# Patient Record
Sex: MALE | Race: WHITE | NOT HISPANIC OR LATINO | Employment: FULL TIME | URBAN - NONMETROPOLITAN AREA
[De-identification: names, ages, dates, MRNs, and addresses within clinical notes are randomized per-mention and may not be internally consistent; named-entity substitution may affect disease eponyms.]

---

## 2017-06-17 ENCOUNTER — OFFICE VISIT (OUTPATIENT)
Dept: URGENT CARE | Facility: CLINIC | Age: 47
End: 2017-06-17

## 2017-06-17 PROCEDURE — 96372 THER/PROPH/DIAG INJ SC/IM: CPT

## 2017-06-17 PROCEDURE — 99213 OFFICE O/P EST LOW 20 MIN: CPT

## 2018-01-13 NOTE — RESULT NOTES
Verified Results  (1) LIPID PANEL, FASTING [id number] 08:43AM Chu [place]     Test Name Result Flag Reference   CHOLESTEROL 194 mg/dL     HDL,DIRECT 29 mg/dL L 40-60   Specimen collection should occur prior to Metamizole administration due to the potential for falsely depressed results  LDL CHOLESTEROL CALCULATED 136 mg/dL H 0-100   Triglyceride:         Normal              <150 mg/dl       Borderline High    150-199 mg/dl       High               200-499 mg/dl       Very High          >499 mg/dl  Cholesterol:         Desirable        <200 mg/dl      Borderline High  200-239 mg/dl      High             >239 mg/dl  HDL Cholesterol:        High    >59 mg/dL      Low     <41 mg/dL  LDL CALCULATED:    This screening LDL is a calculated result  It does not have the accuracy of the Direct Measured LDL in the monitoring of patients with hyperlipidemia and/or statin therapy  Direct Measure LDL (GAW591) must be ordered separately in these patients  TRIGLYCERIDES 143 mg/dL  <=150   Specimen collection should occur prior to N-Acetylcysteine or Metamizole administration due to the potential for falsely depressed results

## 2018-01-16 NOTE — PROGRESS NOTES
Assessment    1  Encounter for preventive health examination (V70 0) (Z00 00)    Plan  Health Maintenance    · Nicotine 21 MG/24HR Transdermal Patch 24 Hour; APPLY 1 PATCH DAILY AS  DIRECTED    Discussion/Summary  Impression: health maintenance visit  Currently, he eats a healthy diet and eats an adequate diet  Prostate cancer screening: the risks and benefits of prostate cancer screening were discussed, prostate cancer screening is current and PSA is not indicated  Testicular cancer screening: the risks and benefits of testicular cancer screening were discussed, self testicular exam technique was taught and monthly self testicular exam was advised  Colorectal cancer screening: colorectal cancer screening is not indicated  The risks and benefits of immunizations were discussed and immunizations are up to date  Advice and education were given regarding nutrition, aerobic exercise, weight bearing exercise, weight loss, calcium supplements, vitamin D supplements, reproductive health, cardiovascular risk reduction, tobacco cessation, alcohol use, sunscreen use, self skin examination, helmet use, seat belt use, fall risk reduction and advanced directive planning  Patient discussion: discussed with the patient  History of Present Illness  HM, Adult Male: The patient is being seen for a health maintenance evaluation  General Health: The patient's health since the last visit is described as good  He has regular dental visits  He denies vision problems  He denies hearing loss  Immunizations status: up to date  Lifestyle:  He consumes a diverse and healthy diet  He has weight concerns  He does not exercise regularly  He does not use tobacco  He denies alcohol use  He denies drug use  Reproductive health:  the patient is sexually active  birth control is not being practiced  He denies erectile dysfunction  Screening: cancer screening reviewed and updated  metabolic screening reviewed and updated     risk screening reviewed and updated  Review of Systems    Constitutional: No fever or chills, feels well, no tiredness, no recent weight gain or weight loss  Eyes: No complaints of eye pain, no red eyes, no discharge from eyes, no itchy eyes  ENT: no complaints of earache, no hearing loss, no nosebleeds, no nasal discharge, no sore throat, no hoarseness  Cardiovascular: No complaints of slow heart rate, no fast heart rate, no chest pain, no palpitations, no leg claudication, no lower extremity  Respiratory: No complaints of shortness of breath, no wheezing, no cough, no SOB on exertion, no orthopnea or PND  Gastrointestinal: No complaints of abdominal pain, no constipation, no nausea or vomiting, no diarrhea or bloody stools  Genitourinary: No complaints of dysuria, no incontinence, no hesitancy, no nocturia, no genital lesion, no testicular pain  Musculoskeletal: No complaints of arthralgia, no myalgias, no joint swelling or stiffness, no limb pain or swelling  Integumentary: No complaints of skin rash or skin lesions, no itching, no skin wound, no dry skin  Neurological: No compliants of headache, no confusion, no convulsions, no numbness or tingling, no dizziness or fainting, no limb weakness, no difficulty walking  Psychiatric: Is not suicidal, no sleep disturbances, no anxiety or depression, no change in personality, no emotional problems  Endocrine: No complaints of proptosis, no hot flashes, no muscle weakness, no erectile dysfunction, no deepening of the voice, no feelings of weakness  Hematologic/Lymphatic: No complaints of swollen glands, no swollen glands in the neck, does not bleed easily, no easy bruising  Active Problems    1  Abnormal glucose (790 29) (R73 09)   2  Dyslipidemia (272 4) (E78 5)   3  Fatigue (780 79) (R53 83)   4  Folliculitis (382 2) (H36 4)   5   Nausea (787 02) (R11 0)    Past Medical History    · History of Denial Of Any Significant Medical History   · History of gastroenteritis (V12 79) (Z87 19)    Surgical History    · History of Cholecystectomy    Family History  Family History    · Family history of Cancer    Social History    · Current Every Day Smoker (305 1)    Current Meds   1  No Reported Medications Recorded    Allergies    1  No Known Drug Allergies    2  IV Dye    Vitals   Recorded: [id number] 04:58PM   Heart Rate 90   Systolic 941, LUE, Sitting   Diastolic 84, LUE, Sitting   Height 6 ft 4 in   Weight 340 lb 4 00 oz   BMI Calculated 41 42   BSA Calculated 2 77     Physical Exam    Constitutional   General appearance: Abnormal   overweight  Eyes   Conjunctiva and lids: No swelling, erythema, or discharge  Pupils and irises: Equal, round and reactive to light  Ears, Nose, Mouth, and Throat   External inspection of ears and nose: Normal     Otoscopic examination: Tympanic membrance translucent with normal light reflex  Canals patent without erythema  Oropharynx: Normal with no erythema, edema, exudate or lesions  Pulmonary   Respiratory effort: No increased work of breathing or signs of respiratory distress  Auscultation of lungs: Clear to auscultation  Cardiovascular   Auscultation of heart: Normal rate and rhythm, normal S1 and S2, without murmurs  Examination of extremities for edema and/or varicosities: Normal     Abdomen   Abdomen: Non-tender, no masses  Liver and spleen: No hepatomegaly or splenomegaly  Lymphatic   Palpation of lymph nodes in neck: No lymphadenopathy  Musculoskeletal   Gait and station: Normal     Skin   Skin and subcutaneous tissue: Normal without rashes or lesions  Neurologic   Cranial nerves: Cranial nerves 2-12 intact  Reflexes: 2+ and symmetric  Sensation: No sensory loss  Psychiatric   Orientation to person, place and time: Normal     Mood and affect: Normal        Procedure    Procedure: Hearing Acuity Test    Indication: Routine screeing  Audiometry: Normal bilaterally     The patient Tolerated the procedure well  There were no complications  Procedure: Visual Acuity Test    Indication: routine screening  Results: normal in both eyes  The patient tolerated the procedure well  There were no complications        Signatures   Electronically signed by : [facility]; May  9 2016  5:28PM EST                       (Author)    Electronically signed by : Author Nick DO; May  9 2016  5:44PM EST                       (Author)

## 2018-01-16 NOTE — RESULT NOTES
Verified Results  (1) HEMOGLOBIN A1C [id number] 11:59AM Miles Avalos Order Number: [id number]     Test Name Result Flag Reference   HEMOGLOBIN A1C 5 3 %  4 0-5 6   EST  AVG   GLUCOSE 105 mg/dl     5 7-6 4% impaired fasting glucose  >=6 5% diagnosis of diabetes    Falsely low levels are seen in conditions linked to short RBC life span-  hemolytic anemia, and splenomegaly  Falsely elevated levels are seen in situations where there is an increased production of RBC- receipt of erythropoietin or blood transfusions  Adopted from ADA-Clinical Practice Recommendations

## 2018-01-16 NOTE — RESULT NOTES
Verified Results  (1) TSH WITH FT4 REFLEX [id number] 11:59AM Joelle Lies Order Number: [id number]  TW Order Number: [id number] Order Number: [id number]     Test Name Result Flag Reference   TSH 2 057 uIU/mL  0 358-3 740     (1) COMPREHENSIVE METABOLIC PANEL [id number] 50:22KC Joelle Brigida Order Number: [id number]  TW Order Number: [id number] Order Number: [id number]     Test Name Result Flag Reference   GLUCOSE,RANDM 85 mg/dL     If the patient is fasting, the ADA then defines impaired fasting glucose as > 100 mg/dL and diabetes as > or equal to 123 mg/dL  SODIUM 140 mmol/L  136-145   POTASSIUM 4 4 mmol/L  3 5-5 3   CHLORIDE 104 mmol/L  100-108   CARBON DIOXIDE 27 mmol/L  21-32   ANION GAP (CALC) 9 mmol/L  4-13   BLOOD UREA NITROGEN 12 mg/dL  5-25   CREATININE 0 90 mg/dL  0 60-1 30   Standardized to IDMS reference method   CALCIUM 9 3 mg/dL  8 3-10 1   BILI, TOTAL 0 30 mg/dL  0 20-1 00   ALK PHOSPHATAS 77 U/L     ALT (SGPT) 37 U/L  12-78   AST(SGOT) 15 U/L  5-45   ALBUMIN 3 6 g/dL  3 5-5 0   TOTAL PROTEIN 6 8 g/dL  6 4-8 2   eGFR Non-African American      >60 0 ml/min/1 73[address] [facility] Disease Education Program recommendations are as follows:  GFR calculation is accurate only with a steady state creatinine  Chronic Kidney disease less than 60 ml/min/1 73 sq  meters  Kidney failure less than 15 ml/min/1 73 sq  meters  (1) LIPID PANEL, FASTING [id number] 11:59AM Joelle Lies Order Number: [id number]  TW Order Number: [id number]     Test Name Result Flag Reference   CHOLESTEROL 181 mg/dL     HDL,DIRECT 29 mg/dL L 40-60   Specimen collection should occur prior to Metamizole administration due to the potential for falsely depressed results     LDL CHOLESTEROL CALCULATED 113 mg/dL H 0-100   Triglyceride:         Normal              <150 mg/dl       Borderline High    150-199 mg/dl       High               200-499 mg/dl       Very High          >499 mg/dl  Cholesterol:         Desirable        <200 mg/dl      Borderline High  200-239 mg/dl      High             >239 mg/dl  HDL Cholesterol:        High    >59 mg/dL      Low     <41 mg/dL  LDL CALCULATED:    This screening LDL is a calculated result  It does not have the accuracy of the Direct Measured LDL in the monitoring of patients with hyperlipidemia and/or statin therapy  Direct Measure LDL (XTJ955) must be ordered separately in these patients  TRIGLYCERIDES 195 mg/dL H <=150   Specimen collection should occur prior to N-Acetylcysteine or Metamizole administration due to the potential for falsely depressed results

## 2020-12-01 ENCOUNTER — HOSPITAL ENCOUNTER (EMERGENCY)
Facility: HOSPITAL | Age: 50
Discharge: HOME | End: 2020-12-01
Attending: EMERGENCY MEDICINE
Payer: OTHER GOVERNMENT

## 2020-12-01 ENCOUNTER — APPOINTMENT (EMERGENCY)
Dept: RADIOLOGY | Facility: HOSPITAL | Age: 50
End: 2020-12-01
Payer: OTHER GOVERNMENT

## 2020-12-01 VITALS
SYSTOLIC BLOOD PRESSURE: 148 MMHG | HEIGHT: 75 IN | BODY MASS INDEX: 37.96 KG/M2 | DIASTOLIC BLOOD PRESSURE: 83 MMHG | WEIGHT: 305.34 LBS | OXYGEN SATURATION: 98 % | TEMPERATURE: 97.9 F | HEART RATE: 83 BPM | RESPIRATION RATE: 16 BRPM

## 2020-12-01 DIAGNOSIS — R51.9 CEPHALGIA: ICD-10-CM

## 2020-12-01 DIAGNOSIS — R05.9 COUGH: ICD-10-CM

## 2020-12-01 DIAGNOSIS — J06.9 URI (UPPER RESPIRATORY INFECTION): Primary | ICD-10-CM

## 2020-12-01 DIAGNOSIS — R11.0 NAUSEA: ICD-10-CM

## 2020-12-01 PROCEDURE — 96374 THER/PROPH/DIAG INJ IV PUSH: CPT

## 2020-12-01 PROCEDURE — U0003 INFECTIOUS AGENT DETECTION BY NUCLEIC ACID (DNA OR RNA); SEVERE ACUTE RESPIRATORY SYNDROME CORONAVIRUS 2 (SARS-COV-2) (CORONAVIRUS DISEASE [COVID-19]), AMPLIFIED PROBE TECHNIQUE, MAKING USE OF HIGH THROUGHPUT TECHNOLOGIES AS DESCRIBED BY CMS-2020-01-R: HCPCS | Performed by: EMERGENCY MEDICINE

## 2020-12-01 PROCEDURE — 96375 TX/PRO/DX INJ NEW DRUG ADDON: CPT

## 2020-12-01 PROCEDURE — 99283 EMERGENCY DEPT VISIT LOW MDM: CPT

## 2020-12-01 PROCEDURE — 96361 HYDRATE IV INFUSION ADD-ON: CPT

## 2020-12-01 PROCEDURE — 71045 X-RAY EXAM CHEST 1 VIEW: CPT

## 2020-12-01 PROCEDURE — 99284 EMERGENCY DEPT VISIT MOD MDM: CPT | Performed by: EMERGENCY MEDICINE

## 2020-12-01 RX ORDER — ONDANSETRON 4 MG/1
4 TABLET, ORALLY DISINTEGRATING ORAL EVERY 6 HOURS PRN
Qty: 20 TABLET | Refills: 0 | Status: SHIPPED | OUTPATIENT
Start: 2020-12-01 | End: 2020-12-07 | Stop reason: HOSPADM

## 2020-12-01 RX ORDER — METOCLOPRAMIDE HYDROCHLORIDE 5 MG/ML
10 INJECTION INTRAMUSCULAR; INTRAVENOUS ONCE
Status: COMPLETED | OUTPATIENT
Start: 2020-12-01 | End: 2020-12-01

## 2020-12-01 RX ORDER — DIPHENHYDRAMINE HYDROCHLORIDE 50 MG/ML
25 INJECTION INTRAMUSCULAR; INTRAVENOUS ONCE
Status: COMPLETED | OUTPATIENT
Start: 2020-12-01 | End: 2020-12-01

## 2020-12-01 RX ORDER — KETOROLAC TROMETHAMINE 30 MG/ML
15 INJECTION, SOLUTION INTRAMUSCULAR; INTRAVENOUS ONCE
Status: COMPLETED | OUTPATIENT
Start: 2020-12-01 | End: 2020-12-01

## 2020-12-01 RX ADMIN — DIPHENHYDRAMINE HYDROCHLORIDE 25 MG: 50 INJECTION, SOLUTION INTRAMUSCULAR; INTRAVENOUS at 14:16

## 2020-12-01 RX ADMIN — SODIUM CHLORIDE 1000 ML: 0.9 INJECTION, SOLUTION INTRAVENOUS at 14:16

## 2020-12-01 RX ADMIN — METOCLOPRAMIDE HYDROCHLORIDE 10 MG: 5 INJECTION INTRAMUSCULAR; INTRAVENOUS at 14:16

## 2020-12-01 RX ADMIN — KETOROLAC TROMETHAMINE 15 MG: 30 INJECTION, SOLUTION INTRAMUSCULAR at 14:16

## 2020-12-03 LAB — SARS-COV-2 RNA SPEC QL NAA+PROBE: NOT DETECTED

## 2020-12-05 ENCOUNTER — APPOINTMENT (EMERGENCY)
Dept: RADIOLOGY | Facility: HOSPITAL | Age: 50
DRG: 871 | End: 2020-12-05

## 2020-12-05 ENCOUNTER — APPOINTMENT (EMERGENCY)
Dept: CT IMAGING | Facility: HOSPITAL | Age: 50
DRG: 871 | End: 2020-12-05

## 2020-12-05 ENCOUNTER — HOSPITAL ENCOUNTER (INPATIENT)
Facility: HOSPITAL | Age: 50
LOS: 2 days | Discharge: HOME HEALTH | DRG: 871 | End: 2020-12-07
Attending: EMERGENCY MEDICINE | Admitting: INTERNAL MEDICINE

## 2020-12-05 DIAGNOSIS — R06.00 DYSPNEA AND RESPIRATORY ABNORMALITY: Primary | ICD-10-CM

## 2020-12-05 DIAGNOSIS — R06.89 DYSPNEA AND RESPIRATORY ABNORMALITY: Primary | ICD-10-CM

## 2020-12-05 DIAGNOSIS — A41.9 SEPSIS, DUE TO UNSPECIFIED ORGANISM, UNSPECIFIED WHETHER ACUTE ORGAN DYSFUNCTION PRESENT (HCC): ICD-10-CM

## 2020-12-05 DIAGNOSIS — R31.29 MICROSCOPIC HEMATURIA: ICD-10-CM

## 2020-12-05 DIAGNOSIS — R25.1 TREMOR: ICD-10-CM

## 2020-12-05 DIAGNOSIS — J32.9 SINUSITIS: ICD-10-CM

## 2020-12-05 DIAGNOSIS — J98.4 CAVITARY LESION OF LUNG: ICD-10-CM

## 2020-12-05 DIAGNOSIS — J02.9 SORE THROAT: ICD-10-CM

## 2020-12-05 DIAGNOSIS — R05.9 COUGH: ICD-10-CM

## 2020-12-05 PROBLEM — Z72.0 TOBACCO USE: Status: ACTIVE | Noted: 2020-12-05

## 2020-12-05 PROBLEM — J90 PLEURAL EFFUSION ON LEFT: Status: ACTIVE | Noted: 2020-12-05

## 2020-12-05 LAB
ALBUMIN SERPL BCP-MCNC: 3.6 G/DL (ref 3.5–5)
ALP SERPL-CCNC: 100 U/L (ref 46–116)
ALT SERPL W P-5'-P-CCNC: 24 U/L (ref 12–78)
ANION GAP SERPL CALCULATED.3IONS-SCNC: 8 MMOL/L (ref 4–13)
APTT PPP: 27 SECONDS (ref 23–37)
AST SERPL W P-5'-P-CCNC: 12 U/L (ref 5–45)
BACTERIA UR QL AUTO: ABNORMAL /HPF
BASOPHILS # BLD AUTO: 0.09 THOUSANDS/ΜL (ref 0–0.1)
BASOPHILS NFR BLD AUTO: 1 % (ref 0–1)
BILIRUB SERPL-MCNC: 0.4 MG/DL (ref 0.2–1)
BILIRUB UR QL STRIP: NEGATIVE
BUN SERPL-MCNC: 12 MG/DL (ref 5–25)
CALCIUM SERPL-MCNC: 9.3 MG/DL (ref 8.3–10.1)
CHLORIDE SERPL-SCNC: 103 MMOL/L (ref 100–108)
CLARITY UR: CLEAR
CO2 SERPL-SCNC: 25 MMOL/L (ref 21–32)
COLOR UR: YELLOW
CREAT SERPL-MCNC: 1.08 MG/DL (ref 0.6–1.3)
EOSINOPHIL # BLD AUTO: 0.57 THOUSAND/ΜL (ref 0–0.61)
EOSINOPHIL NFR BLD AUTO: 4 % (ref 0–6)
ERYTHROCYTE [DISTWIDTH] IN BLOOD BY AUTOMATED COUNT: 12.8 % (ref 11.6–15.1)
FLUAV RNA RESP QL NAA+PROBE: NEGATIVE
FLUBV RNA RESP QL NAA+PROBE: NEGATIVE
GFR SERPL CREATININE-BSD FRML MDRD: 81 ML/MIN/1.73SQ M
GLUCOSE SERPL-MCNC: 94 MG/DL (ref 65–140)
GLUCOSE UR STRIP-MCNC: NEGATIVE MG/DL
HCT VFR BLD AUTO: 51.1 % (ref 36.5–49.3)
HGB BLD-MCNC: 17 G/DL (ref 12–17)
HGB UR QL STRIP.AUTO: ABNORMAL
IMM GRANULOCYTES # BLD AUTO: 0.05 THOUSAND/UL (ref 0–0.2)
IMM GRANULOCYTES NFR BLD AUTO: 0 % (ref 0–2)
INR PPP: 0.91 (ref 0.84–1.19)
KETONES UR STRIP-MCNC: NEGATIVE MG/DL
LEUKOCYTE ESTERASE UR QL STRIP: ABNORMAL
LIPASE SERPL-CCNC: 135 U/L (ref 73–393)
LYMPHOCYTES # BLD AUTO: 2.28 THOUSANDS/ΜL (ref 0.6–4.47)
LYMPHOCYTES NFR BLD AUTO: 16 % (ref 14–44)
MAGNESIUM SERPL-MCNC: 2 MG/DL (ref 1.6–2.6)
MCH RBC QN AUTO: 30.7 PG (ref 26.8–34.3)
MCHC RBC AUTO-ENTMCNC: 33.3 G/DL (ref 31.4–37.4)
MCV RBC AUTO: 92 FL (ref 82–98)
MONOCYTES # BLD AUTO: 0.87 THOUSAND/ΜL (ref 0.17–1.22)
MONOCYTES NFR BLD AUTO: 6 % (ref 4–12)
MUCOUS THREADS UR QL AUTO: ABNORMAL
NEUTROPHILS # BLD AUTO: 10.77 THOUSANDS/ΜL (ref 1.85–7.62)
NEUTS SEG NFR BLD AUTO: 73 % (ref 43–75)
NITRITE UR QL STRIP: NEGATIVE
NON-SQ EPI CELLS URNS QL MICRO: ABNORMAL /HPF
NRBC BLD AUTO-RTO: 0 /100 WBCS
NT-PROBNP SERPL-MCNC: 92 PG/ML
PH UR STRIP.AUTO: 6.5 [PH]
PLATELET # BLD AUTO: 291 THOUSANDS/UL (ref 149–390)
PMV BLD AUTO: 9.9 FL (ref 8.9–12.7)
POTASSIUM SERPL-SCNC: 4.3 MMOL/L (ref 3.5–5.3)
PROT SERPL-MCNC: 8 G/DL (ref 6.4–8.2)
PROT UR STRIP-MCNC: NEGATIVE MG/DL
PROTHROMBIN TIME: 12.1 SECONDS (ref 11.6–14.5)
RBC # BLD AUTO: 5.54 MILLION/UL (ref 3.88–5.62)
RBC #/AREA URNS AUTO: ABNORMAL /HPF
RSV RNA RESP QL NAA+PROBE: NEGATIVE
SARS-COV-2 RNA RESP QL NAA+PROBE: NEGATIVE
SODIUM SERPL-SCNC: 136 MMOL/L (ref 136–145)
SP GR UR STRIP.AUTO: 1.02 (ref 1–1.03)
TROPONIN I SERPL-MCNC: <0.02 NG/ML
UROBILINOGEN UR QL STRIP.AUTO: 0.2 E.U./DL
WBC # BLD AUTO: 14.63 THOUSAND/UL (ref 4.31–10.16)
WBC #/AREA URNS AUTO: ABNORMAL /HPF

## 2020-12-05 PROCEDURE — 0241U HB NFCT DS VIR RESP RNA 4 TRGT: CPT | Performed by: EMERGENCY MEDICINE

## 2020-12-05 PROCEDURE — 71250 CT THORAX DX C-: CPT

## 2020-12-05 PROCEDURE — 83690 ASSAY OF LIPASE: CPT | Performed by: EMERGENCY MEDICINE

## 2020-12-05 PROCEDURE — 84484 ASSAY OF TROPONIN QUANT: CPT | Performed by: EMERGENCY MEDICINE

## 2020-12-05 PROCEDURE — 85610 PROTHROMBIN TIME: CPT | Performed by: EMERGENCY MEDICINE

## 2020-12-05 PROCEDURE — 94640 AIRWAY INHALATION TREATMENT: CPT

## 2020-12-05 PROCEDURE — 83735 ASSAY OF MAGNESIUM: CPT | Performed by: EMERGENCY MEDICINE

## 2020-12-05 PROCEDURE — 85730 THROMBOPLASTIN TIME PARTIAL: CPT | Performed by: EMERGENCY MEDICINE

## 2020-12-05 PROCEDURE — 81001 URINALYSIS AUTO W/SCOPE: CPT | Performed by: EMERGENCY MEDICINE

## 2020-12-05 PROCEDURE — 99291 CRITICAL CARE FIRST HOUR: CPT | Performed by: EMERGENCY MEDICINE

## 2020-12-05 PROCEDURE — 71045 X-RAY EXAM CHEST 1 VIEW: CPT

## 2020-12-05 PROCEDURE — 99223 1ST HOSP IP/OBS HIGH 75: CPT | Performed by: INTERNAL MEDICINE

## 2020-12-05 PROCEDURE — 99285 EMERGENCY DEPT VISIT HI MDM: CPT

## 2020-12-05 PROCEDURE — 93005 ELECTROCARDIOGRAM TRACING: CPT

## 2020-12-05 PROCEDURE — 83880 ASSAY OF NATRIURETIC PEPTIDE: CPT | Performed by: EMERGENCY MEDICINE

## 2020-12-05 PROCEDURE — 85025 COMPLETE CBC W/AUTO DIFF WBC: CPT | Performed by: EMERGENCY MEDICINE

## 2020-12-05 PROCEDURE — 80053 COMPREHEN METABOLIC PANEL: CPT | Performed by: EMERGENCY MEDICINE

## 2020-12-05 PROCEDURE — 36415 COLL VENOUS BLD VENIPUNCTURE: CPT | Performed by: EMERGENCY MEDICINE

## 2020-12-05 RX ORDER — ACETAMINOPHEN 325 MG/1
650 TABLET ORAL EVERY 6 HOURS PRN
Status: DISCONTINUED | OUTPATIENT
Start: 2020-12-05 | End: 2020-12-07 | Stop reason: HOSPADM

## 2020-12-05 RX ORDER — CEFTRIAXONE 1 G/50ML
1000 INJECTION, SOLUTION INTRAVENOUS ONCE
Status: COMPLETED | OUTPATIENT
Start: 2020-12-05 | End: 2020-12-06

## 2020-12-05 RX ORDER — METHYLPREDNISOLONE SODIUM SUCCINATE 125 MG/2ML
125 INJECTION, POWDER, LYOPHILIZED, FOR SOLUTION INTRAMUSCULAR; INTRAVENOUS ONCE
Status: COMPLETED | OUTPATIENT
Start: 2020-12-05 | End: 2020-12-05

## 2020-12-05 RX ORDER — ONDANSETRON 2 MG/ML
4 INJECTION INTRAMUSCULAR; INTRAVENOUS EVERY 6 HOURS PRN
Status: DISCONTINUED | OUTPATIENT
Start: 2020-12-05 | End: 2020-12-07 | Stop reason: HOSPADM

## 2020-12-05 RX ORDER — NICOTINE 21 MG/24HR
1 PATCH, TRANSDERMAL 24 HOURS TRANSDERMAL DAILY
Status: DISCONTINUED | OUTPATIENT
Start: 2020-12-05 | End: 2020-12-07 | Stop reason: HOSPADM

## 2020-12-05 RX ORDER — SODIUM CHLORIDE 9 MG/ML
3 INJECTION INTRAVENOUS
Status: DISCONTINUED | OUTPATIENT
Start: 2020-12-05 | End: 2020-12-07 | Stop reason: HOSPADM

## 2020-12-05 RX ORDER — IPRATROPIUM BROMIDE AND ALBUTEROL SULFATE 2.5; .5 MG/3ML; MG/3ML
3 SOLUTION RESPIRATORY (INHALATION) ONCE
Status: COMPLETED | OUTPATIENT
Start: 2020-12-05 | End: 2020-12-05

## 2020-12-05 RX ORDER — CEFEPIME HYDROCHLORIDE 2 G/50ML
2000 INJECTION, SOLUTION INTRAVENOUS EVERY 12 HOURS
Status: DISCONTINUED | OUTPATIENT
Start: 2020-12-05 | End: 2020-12-07 | Stop reason: HOSPADM

## 2020-12-05 RX ORDER — SODIUM CHLORIDE 9 MG/ML
125 INJECTION, SOLUTION INTRAVENOUS CONTINUOUS
Status: DISCONTINUED | OUTPATIENT
Start: 2020-12-05 | End: 2020-12-07 | Stop reason: HOSPADM

## 2020-12-05 RX ORDER — AZITHROMYCIN 250 MG/1
500 TABLET, FILM COATED ORAL ONCE
Status: COMPLETED | OUTPATIENT
Start: 2020-12-05 | End: 2020-12-05

## 2020-12-05 RX ADMIN — AZITHROMYCIN 500 MG: 250 TABLET, FILM COATED ORAL at 18:04

## 2020-12-05 RX ADMIN — SODIUM CHLORIDE 125 ML/HR: 0.9 INJECTION, SOLUTION INTRAVENOUS at 19:46

## 2020-12-05 RX ADMIN — CEFTRIAXONE 1000 MG: 1 INJECTION, SOLUTION INTRAVENOUS at 18:05

## 2020-12-05 RX ADMIN — CEFEPIME HYDROCHLORIDE 2000 MG: 2 INJECTION, SOLUTION INTRAVENOUS at 19:47

## 2020-12-05 RX ADMIN — METRONIDAZOLE 500 MG: 500 INJECTION, SOLUTION INTRAVENOUS at 22:00

## 2020-12-05 RX ADMIN — PHENOL 1 SPRAY: 1.5 LIQUID ORAL at 19:48

## 2020-12-05 RX ADMIN — VANCOMYCIN HYDROCHLORIDE 2000 MG: 1 INJECTION, POWDER, LYOPHILIZED, FOR SOLUTION INTRAVENOUS at 23:05

## 2020-12-05 RX ADMIN — METHYLPREDNISOLONE SODIUM SUCCINATE 125 MG: 125 INJECTION, POWDER, FOR SOLUTION INTRAMUSCULAR; INTRAVENOUS at 19:49

## 2020-12-05 RX ADMIN — ACETAMINOPHEN 650 MG: 325 TABLET ORAL at 19:47

## 2020-12-05 RX ADMIN — ENOXAPARIN SODIUM 40 MG: 40 INJECTION SUBCUTANEOUS at 19:49

## 2020-12-05 RX ADMIN — IPRATROPIUM BROMIDE AND ALBUTEROL SULFATE 3 ML: .5; 3 SOLUTION RESPIRATORY (INHALATION) at 14:49

## 2020-12-06 LAB
BASOPHILS # BLD AUTO: 0.04 THOUSANDS/ΜL (ref 0–0.1)
BASOPHILS NFR BLD AUTO: 0 % (ref 0–1)
D DIMER PPP FEU-MCNC: 0.83 UG/ML FEU
EOSINOPHIL # BLD AUTO: 0.01 THOUSAND/ΜL (ref 0–0.61)
EOSINOPHIL NFR BLD AUTO: 0 % (ref 0–6)
ERYTHROCYTE [DISTWIDTH] IN BLOOD BY AUTOMATED COUNT: 12.8 % (ref 11.6–15.1)
HCT VFR BLD AUTO: 48.4 % (ref 36.5–49.3)
HGB BLD-MCNC: 15.8 G/DL (ref 12–17)
IMM GRANULOCYTES # BLD AUTO: 0.06 THOUSAND/UL (ref 0–0.2)
IMM GRANULOCYTES NFR BLD AUTO: 0 % (ref 0–2)
L PNEUMO1 AG UR QL IA.RAPID: NEGATIVE
LACTATE SERPL-SCNC: 1.5 MMOL/L (ref 0.5–2)
LYMPHOCYTES # BLD AUTO: 1.36 THOUSANDS/ΜL (ref 0.6–4.47)
LYMPHOCYTES NFR BLD AUTO: 9 % (ref 14–44)
MCH RBC QN AUTO: 30.6 PG (ref 26.8–34.3)
MCHC RBC AUTO-ENTMCNC: 32.6 G/DL (ref 31.4–37.4)
MCV RBC AUTO: 94 FL (ref 82–98)
MONOCYTES # BLD AUTO: 0.69 THOUSAND/ΜL (ref 0.17–1.22)
MONOCYTES NFR BLD AUTO: 5 % (ref 4–12)
NEUTROPHILS # BLD AUTO: 13.23 THOUSANDS/ΜL (ref 1.85–7.62)
NEUTS SEG NFR BLD AUTO: 86 % (ref 43–75)
NRBC BLD AUTO-RTO: 0 /100 WBCS
PLATELET # BLD AUTO: 273 THOUSANDS/UL (ref 149–390)
PMV BLD AUTO: 11 FL (ref 8.9–12.7)
RBC # BLD AUTO: 5.16 MILLION/UL (ref 3.88–5.62)
S PNEUM AG UR QL: NEGATIVE
WBC # BLD AUTO: 15.39 THOUSAND/UL (ref 4.31–10.16)

## 2020-12-06 PROCEDURE — 87086 URINE CULTURE/COLONY COUNT: CPT | Performed by: INTERNAL MEDICINE

## 2020-12-06 PROCEDURE — 83605 ASSAY OF LACTIC ACID: CPT | Performed by: INTERNAL MEDICINE

## 2020-12-06 PROCEDURE — 87205 SMEAR GRAM STAIN: CPT | Performed by: INTERNAL MEDICINE

## 2020-12-06 PROCEDURE — 85025 COMPLETE CBC W/AUTO DIFF WBC: CPT | Performed by: INTERNAL MEDICINE

## 2020-12-06 PROCEDURE — 99232 SBSQ HOSP IP/OBS MODERATE 35: CPT | Performed by: INTERNAL MEDICINE

## 2020-12-06 PROCEDURE — 86769 SARS-COV-2 COVID-19 ANTIBODY: CPT | Performed by: INTERNAL MEDICINE

## 2020-12-06 PROCEDURE — 87449 NOS EACH ORGANISM AG IA: CPT | Performed by: INTERNAL MEDICINE

## 2020-12-06 PROCEDURE — 85379 FIBRIN DEGRADATION QUANT: CPT | Performed by: INTERNAL MEDICINE

## 2020-12-06 PROCEDURE — 87081 CULTURE SCREEN ONLY: CPT | Performed by: INTERNAL MEDICINE

## 2020-12-06 RX ORDER — DIPHENHYDRAMINE HCL 25 MG
50 TABLET ORAL
Status: DISCONTINUED | OUTPATIENT
Start: 2020-12-06 | End: 2020-12-07 | Stop reason: HOSPADM

## 2020-12-06 RX ORDER — BENZONATATE 100 MG/1
100 CAPSULE ORAL 3 TIMES DAILY PRN
Status: DISCONTINUED | OUTPATIENT
Start: 2020-12-06 | End: 2020-12-07 | Stop reason: HOSPADM

## 2020-12-06 RX ORDER — METHYLPREDNISOLONE 4 MG/1
32 TABLET ORAL
Status: COMPLETED | OUTPATIENT
Start: 2020-12-06 | End: 2020-12-06

## 2020-12-06 RX ADMIN — DIPHENHYDRAMINE HCL 50 MG: 25 TABLET, COATED ORAL at 22:50

## 2020-12-06 RX ADMIN — VANCOMYCIN HYDROCHLORIDE 2000 MG: 1 INJECTION, POWDER, LYOPHILIZED, FOR SOLUTION INTRAVENOUS at 08:21

## 2020-12-06 RX ADMIN — SODIUM CHLORIDE 125 ML/HR: 0.9 INJECTION, SOLUTION INTRAVENOUS at 06:37

## 2020-12-06 RX ADMIN — ENOXAPARIN SODIUM 40 MG: 40 INJECTION SUBCUTANEOUS at 18:07

## 2020-12-06 RX ADMIN — METRONIDAZOLE 500 MG: 500 INJECTION, SOLUTION INTRAVENOUS at 19:54

## 2020-12-06 RX ADMIN — VANCOMYCIN HYDROCHLORIDE 2000 MG: 1 INJECTION, POWDER, LYOPHILIZED, FOR SOLUTION INTRAVENOUS at 21:35

## 2020-12-06 RX ADMIN — ONDANSETRON 4 MG: 2 INJECTION INTRAMUSCULAR; INTRAVENOUS at 09:56

## 2020-12-06 RX ADMIN — METRONIDAZOLE 500 MG: 500 INJECTION, SOLUTION INTRAVENOUS at 04:38

## 2020-12-06 RX ADMIN — CEFEPIME HYDROCHLORIDE 2000 MG: 2 INJECTION, SOLUTION INTRAVENOUS at 19:51

## 2020-12-06 RX ADMIN — METRONIDAZOLE 500 MG: 500 INJECTION, SOLUTION INTRAVENOUS at 11:41

## 2020-12-06 RX ADMIN — METHYLPREDNISOLONE 32 MG: 4 TABLET ORAL at 22:50

## 2020-12-06 RX ADMIN — CEFEPIME HYDROCHLORIDE 2000 MG: 2 INJECTION, SOLUTION INTRAVENOUS at 06:35

## 2020-12-06 RX ADMIN — METHYLPREDNISOLONE 32 MG: 4 TABLET ORAL at 11:41

## 2020-12-07 ENCOUNTER — APPOINTMENT (INPATIENT)
Dept: NON INVASIVE DIAGNOSTICS | Facility: HOSPITAL | Age: 50
DRG: 871 | End: 2020-12-07

## 2020-12-07 ENCOUNTER — APPOINTMENT (INPATIENT)
Dept: CT IMAGING | Facility: HOSPITAL | Age: 50
DRG: 871 | End: 2020-12-07

## 2020-12-07 ENCOUNTER — TRANSITIONAL CARE MANAGEMENT (OUTPATIENT)
Dept: FAMILY MEDICINE CLINIC | Facility: CLINIC | Age: 50
End: 2020-12-07

## 2020-12-07 VITALS
OXYGEN SATURATION: 94 % | HEIGHT: 75 IN | WEIGHT: 296.74 LBS | BODY MASS INDEX: 36.9 KG/M2 | DIASTOLIC BLOOD PRESSURE: 82 MMHG | SYSTOLIC BLOOD PRESSURE: 130 MMHG | HEART RATE: 71 BPM | RESPIRATION RATE: 16 BRPM | TEMPERATURE: 97.7 F

## 2020-12-07 PROBLEM — R25.1 TREMOR: Status: ACTIVE | Noted: 2020-12-07

## 2020-12-07 LAB
ALBUMIN SERPL BCP-MCNC: 3 G/DL (ref 3.5–5)
ALP SERPL-CCNC: 73 U/L (ref 46–116)
ALT SERPL W P-5'-P-CCNC: 18 U/L (ref 12–78)
ANION GAP SERPL CALCULATED.3IONS-SCNC: 9 MMOL/L (ref 4–13)
AST SERPL W P-5'-P-CCNC: 12 U/L (ref 5–45)
ATRIAL RATE: 91 BPM
BACTERIA SPT RESP CULT: ABNORMAL
BACTERIA UR CULT: NORMAL
BASOPHILS # BLD AUTO: 0.03 THOUSANDS/ΜL (ref 0–0.1)
BASOPHILS NFR BLD AUTO: 0 % (ref 0–1)
BILIRUB SERPL-MCNC: 0.3 MG/DL (ref 0.2–1)
BUN SERPL-MCNC: 15 MG/DL (ref 5–25)
CALCIUM ALBUM COR SERPL-MCNC: 10.2 MG/DL (ref 8.3–10.1)
CALCIUM SERPL-MCNC: 9.4 MG/DL (ref 8.3–10.1)
CHLORIDE SERPL-SCNC: 104 MMOL/L (ref 100–108)
CK SERPL-CCNC: 134 U/L (ref 39–308)
CO2 SERPL-SCNC: 23 MMOL/L (ref 21–32)
CREAT SERPL-MCNC: 0.97 MG/DL (ref 0.6–1.3)
D DIMER PPP FEU-MCNC: 1.17 UG/ML FEU
EOSINOPHIL # BLD AUTO: 0.02 THOUSAND/ΜL (ref 0–0.61)
EOSINOPHIL NFR BLD AUTO: 0 % (ref 0–6)
ERYTHROCYTE [DISTWIDTH] IN BLOOD BY AUTOMATED COUNT: 13 % (ref 11.6–15.1)
GFR SERPL CREATININE-BSD FRML MDRD: 92 ML/MIN/1.73SQ M
GLUCOSE SERPL-MCNC: 123 MG/DL (ref 65–140)
GRAM STN SPEC: ABNORMAL
HAV IGM SER QL: NORMAL
HBV CORE IGM SER QL: NORMAL
HBV SURFACE AG SER QL: NORMAL
HCT VFR BLD AUTO: 46.8 % (ref 36.5–49.3)
HCV AB SER QL: NORMAL
HGB BLD-MCNC: 15.2 G/DL (ref 12–17)
IMM GRANULOCYTES # BLD AUTO: 0.08 THOUSAND/UL (ref 0–0.2)
IMM GRANULOCYTES NFR BLD AUTO: 1 % (ref 0–2)
LYMPHOCYTES # BLD AUTO: 1.44 THOUSANDS/ΜL (ref 0.6–4.47)
LYMPHOCYTES NFR BLD AUTO: 9 % (ref 14–44)
MAGNESIUM SERPL-MCNC: 2.1 MG/DL (ref 1.6–2.6)
MCH RBC QN AUTO: 30.3 PG (ref 26.8–34.3)
MCHC RBC AUTO-ENTMCNC: 32.5 G/DL (ref 31.4–37.4)
MCV RBC AUTO: 93 FL (ref 82–98)
MONOCYTES # BLD AUTO: 0.61 THOUSAND/ΜL (ref 0.17–1.22)
MONOCYTES NFR BLD AUTO: 4 % (ref 4–12)
MRSA NOSE QL CULT: NORMAL
NEUTROPHILS # BLD AUTO: 13.35 THOUSANDS/ΜL (ref 1.85–7.62)
NEUTS SEG NFR BLD AUTO: 86 % (ref 43–75)
NRBC BLD AUTO-RTO: 0 /100 WBCS
P AXIS: 20 DEGREES
PHOSPHATE SERPL-MCNC: 4.2 MG/DL (ref 2.7–4.5)
PLATELET # BLD AUTO: 251 THOUSANDS/UL (ref 149–390)
PMV BLD AUTO: 10.8 FL (ref 8.9–12.7)
POTASSIUM SERPL-SCNC: 4.1 MMOL/L (ref 3.5–5.3)
PR INTERVAL: 126 MS
PROCALCITONIN SERPL-MCNC: <0.05 NG/ML
PROT SERPL-MCNC: 7.3 G/DL (ref 6.4–8.2)
QRS AXIS: 34 DEGREES
QRSD INTERVAL: 90 MS
QT INTERVAL: 362 MS
QTC INTERVAL: 445 MS
RBC # BLD AUTO: 5.02 MILLION/UL (ref 3.88–5.62)
SARS-COV-2 IGG+IGM SERPL QL IA: NORMAL
SODIUM SERPL-SCNC: 136 MMOL/L (ref 136–145)
T WAVE AXIS: 49 DEGREES
VANCOMYCIN TROUGH SERPL-MCNC: 12.2 UG/ML (ref 10–20)
VENTRICULAR RATE: 91 BPM
WBC # BLD AUTO: 15.53 THOUSAND/UL (ref 4.31–10.16)

## 2020-12-07 PROCEDURE — 87040 BLOOD CULTURE FOR BACTERIA: CPT | Performed by: SURGERY

## 2020-12-07 PROCEDURE — 99255 IP/OBS CONSLTJ NEW/EST HI 80: CPT | Performed by: INTERNAL MEDICINE

## 2020-12-07 PROCEDURE — 85379 FIBRIN DEGRADATION QUANT: CPT | Performed by: INTERNAL MEDICINE

## 2020-12-07 PROCEDURE — 87389 HIV-1 AG W/HIV-1&-2 AB AG IA: CPT | Performed by: INTERNAL MEDICINE

## 2020-12-07 PROCEDURE — 85025 COMPLETE CBC W/AUTO DIFF WBC: CPT | Performed by: INTERNAL MEDICINE

## 2020-12-07 PROCEDURE — 83735 ASSAY OF MAGNESIUM: CPT | Performed by: INTERNAL MEDICINE

## 2020-12-07 PROCEDURE — G1004 CDSM NDSC: HCPCS

## 2020-12-07 PROCEDURE — 80053 COMPREHEN METABOLIC PANEL: CPT | Performed by: INTERNAL MEDICINE

## 2020-12-07 PROCEDURE — 80074 ACUTE HEPATITIS PANEL: CPT | Performed by: INTERNAL MEDICINE

## 2020-12-07 PROCEDURE — 82550 ASSAY OF CK (CPK): CPT | Performed by: INTERNAL MEDICINE

## 2020-12-07 PROCEDURE — 80202 ASSAY OF VANCOMYCIN: CPT | Performed by: INTERNAL MEDICINE

## 2020-12-07 PROCEDURE — 71275 CT ANGIOGRAPHY CHEST: CPT

## 2020-12-07 PROCEDURE — 93306 TTE W/DOPPLER COMPLETE: CPT

## 2020-12-07 PROCEDURE — 84100 ASSAY OF PHOSPHORUS: CPT | Performed by: INTERNAL MEDICINE

## 2020-12-07 PROCEDURE — 99239 HOSP IP/OBS DSCHRG MGMT >30: CPT | Performed by: FAMILY MEDICINE

## 2020-12-07 PROCEDURE — 93010 ELECTROCARDIOGRAM REPORT: CPT | Performed by: INTERNAL MEDICINE

## 2020-12-07 PROCEDURE — 93306 TTE W/DOPPLER COMPLETE: CPT | Performed by: INTERNAL MEDICINE

## 2020-12-07 PROCEDURE — 84145 PROCALCITONIN (PCT): CPT | Performed by: INTERNAL MEDICINE

## 2020-12-07 PROCEDURE — 86738 MYCOPLASMA ANTIBODY: CPT | Performed by: INTERNAL MEDICINE

## 2020-12-07 RX ORDER — ACETAMINOPHEN 325 MG/1
650 TABLET ORAL EVERY 6 HOURS PRN
Qty: 30 TABLET | Refills: 0 | Status: SHIPPED | OUTPATIENT
Start: 2020-12-07 | End: 2020-12-16

## 2020-12-07 RX ORDER — AMOXICILLIN AND CLAVULANATE POTASSIUM 875; 125 MG/1; MG/1
1 TABLET, FILM COATED ORAL EVERY 12 HOURS SCHEDULED
Qty: 28 TABLET | Refills: 0 | Status: SHIPPED | OUTPATIENT
Start: 2020-12-07 | End: 2020-12-21

## 2020-12-07 RX ORDER — BENZONATATE 100 MG/1
100 CAPSULE ORAL 3 TIMES DAILY PRN
Qty: 20 CAPSULE | Refills: 0 | Status: SHIPPED | OUTPATIENT
Start: 2020-12-07 | End: 2021-01-29

## 2020-12-07 RX ADMIN — METRONIDAZOLE 500 MG: 500 INJECTION, SOLUTION INTRAVENOUS at 04:08

## 2020-12-07 RX ADMIN — CEFEPIME HYDROCHLORIDE 2000 MG: 2 INJECTION, SOLUTION INTRAVENOUS at 06:06

## 2020-12-07 RX ADMIN — METRONIDAZOLE 500 MG: 500 INJECTION, SOLUTION INTRAVENOUS at 12:21

## 2020-12-07 RX ADMIN — VANCOMYCIN HYDROCHLORIDE 2000 MG: 1 INJECTION, POWDER, LYOPHILIZED, FOR SOLUTION INTRAVENOUS at 08:41

## 2020-12-07 RX ADMIN — IOHEXOL 100 ML: 350 INJECTION, SOLUTION INTRAVENOUS at 00:18

## 2020-12-07 RX ADMIN — SODIUM CHLORIDE 125 ML/HR: 0.9 INJECTION, SOLUTION INTRAVENOUS at 15:29

## 2020-12-07 RX ADMIN — SODIUM CHLORIDE 125 ML/HR: 0.9 INJECTION, SOLUTION INTRAVENOUS at 03:32

## 2020-12-08 ENCOUNTER — TELEMEDICINE (OUTPATIENT)
Dept: FAMILY MEDICINE CLINIC | Facility: CLINIC | Age: 50
End: 2020-12-08

## 2020-12-08 DIAGNOSIS — Z76.89 ENCOUNTER FOR SUPPORT AND COORDINATION OF TRANSITION OF CARE: Primary | ICD-10-CM

## 2020-12-08 DIAGNOSIS — J98.4 CAVITARY LESION OF LUNG: Primary | ICD-10-CM

## 2020-12-08 DIAGNOSIS — J98.4 CAVITARY LESION OF LUNG: ICD-10-CM

## 2020-12-08 LAB — HIV 1+2 AB+HIV1 P24 AG SERPL QL IA: NORMAL

## 2020-12-08 PROCEDURE — 99496 TRANSJ CARE MGMT HIGH F2F 7D: CPT | Performed by: FAMILY MEDICINE

## 2020-12-08 RX ORDER — IBUPROFEN 200 MG
TABLET ORAL EVERY 6 HOURS PRN
COMMUNITY
End: 2020-12-16

## 2020-12-09 ENCOUNTER — TELEPHONE (OUTPATIENT)
Dept: PULMONOLOGY | Facility: CLINIC | Age: 50
End: 2020-12-09

## 2020-12-09 LAB
M PNEUMO IGG SER IA-ACNC: 273 U/ML (ref 0–99)
M PNEUMO IGM SER IA-ACNC: <770 U/ML (ref 0–769)

## 2020-12-10 ENCOUNTER — TELEPHONE (OUTPATIENT)
Dept: UROLOGY | Facility: CLINIC | Age: 50
End: 2020-12-10

## 2020-12-12 LAB
BACTERIA BLD CULT: NORMAL
BACTERIA BLD CULT: NORMAL

## 2020-12-16 ENCOUNTER — TELEMEDICINE (OUTPATIENT)
Dept: FAMILY MEDICINE CLINIC | Facility: CLINIC | Age: 50
End: 2020-12-16

## 2020-12-16 DIAGNOSIS — R31.29 MICROSCOPIC HEMATURIA: Primary | ICD-10-CM

## 2020-12-16 PROCEDURE — 99213 OFFICE O/P EST LOW 20 MIN: CPT | Performed by: FAMILY MEDICINE

## 2020-12-21 ENCOUNTER — APPOINTMENT (OUTPATIENT)
Dept: LAB | Facility: HOSPITAL | Age: 50
End: 2020-12-21

## 2020-12-21 DIAGNOSIS — J98.4 CAVITARY LESION OF LUNG: ICD-10-CM

## 2020-12-21 LAB
BACTERIA UR QL AUTO: ABNORMAL /HPF
BILIRUB UR QL STRIP: NEGATIVE
CLARITY UR: CLEAR
COLOR UR: YELLOW
GLUCOSE UR STRIP-MCNC: NEGATIVE MG/DL
HGB UR QL STRIP.AUTO: ABNORMAL
KETONES UR STRIP-MCNC: ABNORMAL MG/DL
LEUKOCYTE ESTERASE UR QL STRIP: NEGATIVE
MUCOUS THREADS UR QL AUTO: ABNORMAL
NITRITE UR QL STRIP: NEGATIVE
NON-SQ EPI CELLS URNS QL MICRO: ABNORMAL /HPF
PH UR STRIP.AUTO: 5 [PH]
PROT UR STRIP-MCNC: NEGATIVE MG/DL
RBC #/AREA URNS AUTO: ABNORMAL /HPF
SP GR UR STRIP.AUTO: 1.03 (ref 1–1.03)
UROBILINOGEN UR QL STRIP.AUTO: 0.2 E.U./DL
WBC #/AREA URNS AUTO: ABNORMAL /HPF

## 2020-12-21 PROCEDURE — 36415 COLL VENOUS BLD VENIPUNCTURE: CPT

## 2020-12-21 PROCEDURE — 86480 TB TEST CELL IMMUN MEASURE: CPT

## 2020-12-21 PROCEDURE — 81001 URINALYSIS AUTO W/SCOPE: CPT | Performed by: STUDENT IN AN ORGANIZED HEALTH CARE EDUCATION/TRAINING PROGRAM

## 2020-12-23 ENCOUNTER — TELEMEDICINE (OUTPATIENT)
Dept: FAMILY MEDICINE CLINIC | Facility: CLINIC | Age: 50
End: 2020-12-23
Payer: COMMERCIAL

## 2020-12-23 DIAGNOSIS — J98.4 CAVITARY LESION OF LUNG: Primary | ICD-10-CM

## 2020-12-23 DIAGNOSIS — Z71.6 ENCOUNTER FOR SMOKING CESSATION COUNSELING: ICD-10-CM

## 2020-12-23 LAB
GAMMA INTERFERON BACKGROUND BLD IA-ACNC: 0.05 IU/ML
M TB IFN-G BLD-IMP: NEGATIVE
M TB IFN-G CD4+ BCKGRND COR BLD-ACNC: 0 IU/ML
M TB IFN-G CD4+ BCKGRND COR BLD-ACNC: 0 IU/ML
MITOGEN IGNF BCKGRD COR BLD-ACNC: >10 IU/ML

## 2020-12-23 PROCEDURE — 99213 OFFICE O/P EST LOW 20 MIN: CPT | Performed by: FAMILY MEDICINE

## 2020-12-23 RX ORDER — BUPROPION HYDROCHLORIDE 150 MG/1
TABLET, EXTENDED RELEASE ORAL
Qty: 60 TABLET | Refills: 0 | Status: SHIPPED | OUTPATIENT
Start: 2020-12-23 | End: 2021-01-29

## 2020-12-31 ENCOUNTER — OFFICE VISIT (OUTPATIENT)
Dept: FAMILY MEDICINE CLINIC | Facility: CLINIC | Age: 50
End: 2020-12-31

## 2020-12-31 VITALS
WEIGHT: 305.2 LBS | BODY MASS INDEX: 37.95 KG/M2 | HEIGHT: 75 IN | HEART RATE: 92 BPM | RESPIRATION RATE: 18 BRPM | DIASTOLIC BLOOD PRESSURE: 88 MMHG | OXYGEN SATURATION: 98 % | SYSTOLIC BLOOD PRESSURE: 126 MMHG | TEMPERATURE: 98.2 F

## 2020-12-31 DIAGNOSIS — Z72.0 TOBACCO USE: ICD-10-CM

## 2020-12-31 DIAGNOSIS — J98.4 CAVITARY LESION OF LUNG: Primary | ICD-10-CM

## 2020-12-31 PROCEDURE — T1015 CLINIC SERVICE: HCPCS | Performed by: FAMILY MEDICINE

## 2021-01-22 ENCOUNTER — HOSPITAL ENCOUNTER (OUTPATIENT)
Dept: CT IMAGING | Facility: HOSPITAL | Age: 51
Discharge: HOME | End: 2021-01-22

## 2021-01-22 DIAGNOSIS — J98.4 CAVITARY LESION OF LUNG: ICD-10-CM

## 2021-01-22 PROCEDURE — 71250 CT THORAX DX C-: CPT

## 2021-01-22 PROCEDURE — G1004 CDSM NDSC: HCPCS

## 2021-01-29 ENCOUNTER — OFFICE VISIT (OUTPATIENT)
Dept: FAMILY MEDICINE CLINIC | Facility: CLINIC | Age: 51
End: 2021-01-29
Payer: COMMERCIAL

## 2021-01-29 VITALS
RESPIRATION RATE: 16 BRPM | DIASTOLIC BLOOD PRESSURE: 76 MMHG | TEMPERATURE: 98 F | BODY MASS INDEX: 38.24 KG/M2 | HEIGHT: 75 IN | SYSTOLIC BLOOD PRESSURE: 124 MMHG | WEIGHT: 307.6 LBS

## 2021-01-29 DIAGNOSIS — J98.4 CAVITARY LESION OF LUNG: ICD-10-CM

## 2021-01-29 DIAGNOSIS — G47.33 OSA (OBSTRUCTIVE SLEEP APNEA): Primary | ICD-10-CM

## 2021-01-29 PROCEDURE — T1015 CLINIC SERVICE: HCPCS | Performed by: FAMILY MEDICINE

## 2021-01-30 NOTE — PROGRESS NOTES
Assessment/Plan:    Nilsa Duncan is a 52year old male with PMHx of tobacco abuse and hospitalization from 12/5/20-12/7/20 with sepsis and possible cavitary pneumonia presents today for follow up  Diagnoses and all orders for this visit:    SKYLER (obstructive sleep apnea) rule out  -     Home Study; Future  - Request of home sleep study for  license due to his increased neck circumference  - Patient does have risk factors for SKYLER with increased neck circumference, BMI of 38, but denied any daytime sleepiness or nocturnal apnea, no history of HTN  He agrees to having home sleep study for evaluation  Cavitary lesion of lung    - DDx: focal necrotizing pneumonia, septic emboli, neoplasm (not likely)   - elevated mycoplasma IgG on 12/7/20   - ECHO WNL on 12/7/20, COVID antibody and PCR negative   - Quantiferon gold TB returned negative on 12/21/20   - completed two weeks course of PO Augmentin on 12/22/20 since discharge   - clinically improved   - Repeat CT chest on 1/22/21 revealed cavitary nodule decreased in size and soft tissue rim thickness    - f/u outpatient Pulm appointment on 2/2/21      F/u in one month  Case d/w Dr Valente Garcia  Subjective:      Patient ID: Nilsa Duncan is a 52 y o  male  HPI     Patient is a 52years old male with tobacco abuse and hospitalization in December 2020 with sepsis and possible cavitary pneumonia presents today for follow-up  Patient  reports 90% improvement in his symptoms  Denies any fever, chills, shortness of breath, chest pain, dizziness  Reports mild nonproductive cough  Patient underwent repeated CT chest on January 22, 2020 1st with cavitary nodule in the left lower lobe has significantly decreased in size compare to prior study in December  Patient has scheduled follow up appointment with pulmonology on 2/2/21      Patient is applying for  license and he was told to have a sleep study due to increased neck circumference  Wife reports patient has intermittent snoring at night  Patient denied any daytime fatigue/ sleepiness or nocturnal apnea  Patient has no history of HTN  Patient continues to smoke 1PPD  He reports desire for quitting but has not started on wellbutrin  The following portions of the patient's history were reviewed and updated as appropriate: allergies, current medications, past family history, past medical history, past social history, past surgical history and problem list     Review of Systems   Constitutional: Negative for chills and fever  HENT: Negative for congestion, rhinorrhea and sore throat  Eyes: Negative for visual disturbance  Respiratory: Positive for cough  Negative for shortness of breath  Cardiovascular: Negative for chest pain and palpitations  Gastrointestinal: Negative for abdominal pain, nausea and vomiting  Genitourinary: Negative for dysuria  Musculoskeletal: Negative for back pain  Skin: Negative for color change  Neurological: Negative for dizziness and headaches  Hematological: Does not bruise/bleed easily  Psychiatric/Behavioral: Negative for confusion  Objective:      /76   Temp 98 °F (36 7 °C)   Resp 16   Ht 6' 3" (1 905 m)   Wt (!) 140 kg (307 lb 9 6 oz)   BMI 38 45 kg/m²          Physical Exam  Vitals signs and nursing note reviewed  Constitutional:       General: He is not in acute distress  Appearance: He is well-developed  HENT:      Head: Normocephalic  Mouth/Throat:      Pharynx: No oropharyngeal exudate  Eyes:      General: No scleral icterus  Right eye: No discharge  Left eye: No discharge  Conjunctiva/sclera: Conjunctivae normal    Neck:      Musculoskeletal: Normal range of motion  Comments: Neck circumference of 48 3cm   Cardiovascular:      Rate and Rhythm: Normal rate and regular rhythm  Heart sounds: Normal heart sounds  No murmur     Pulmonary:      Effort: Pulmonary effort is normal  No respiratory distress  Breath sounds: Normal breath sounds  No wheezing  Abdominal:      General: Bowel sounds are normal  There is no distension  Palpations: Abdomen is soft  Tenderness: There is no abdominal tenderness  Musculoskeletal:         General: No tenderness  Lymphadenopathy:      Cervical: No cervical adenopathy  Skin:     Findings: No erythema  Neurological:      Mental Status: He is alert and oriented to person, place, and time     Psychiatric:         Behavior: Behavior normal

## 2021-02-03 ENCOUNTER — TELEPHONE (OUTPATIENT)
Dept: PULMONOLOGY | Facility: CLINIC | Age: 51
End: 2021-02-03

## 2021-02-03 NOTE — TELEPHONE ENCOUNTER
Pt scheduled for 02/12/2021      ----- Message from Gunnar Baldwin sent at 2/2/2021 12:01 PM EST -----  JEANNA Manley,    I could not get in touch with this patient yesterday,  Can you please call and try to reschedule hospital follow up  Thanks!   Melissa Aguilar

## 2021-02-09 ENCOUNTER — TELEPHONE (OUTPATIENT)
Dept: SLEEP CENTER | Facility: CLINIC | Age: 51
End: 2021-02-09

## 2021-02-09 NOTE — TELEPHONE ENCOUNTER
----- Message from Joy Kapoor DO sent at 2/8/2021  6:01 PM EST -----  Study approved  Requesting physician responsible for follow up      ----- Message -----  From: Lashawn Terry  Sent: 2/5/2021   9:17 AM EST  To: Sleep Medicine Juan Provider    This sleep study needs approval      If approved please sign and return to clerical pool  If denied please include reasons why  Also provide alternative testing if warranted  Please sign and return to clerical pool

## 2021-02-12 ENCOUNTER — TELEMEDICINE (OUTPATIENT)
Dept: PULMONOLOGY | Facility: CLINIC | Age: 51
End: 2021-02-12

## 2021-02-12 VITALS — HEIGHT: 75 IN | BODY MASS INDEX: 38.54 KG/M2 | WEIGHT: 310 LBS | OXYGEN SATURATION: 98 %

## 2021-02-12 DIAGNOSIS — E66.9 OBESITY (BMI 30-39.9): ICD-10-CM

## 2021-02-12 DIAGNOSIS — J98.4 CAVITARY LESION OF LUNG: Primary | ICD-10-CM

## 2021-02-12 DIAGNOSIS — F17.210 CIGARETTE NICOTINE DEPENDENCE WITHOUT COMPLICATION: ICD-10-CM

## 2021-02-12 PROCEDURE — 99406 BEHAV CHNG SMOKING 3-10 MIN: CPT | Performed by: PHYSICIAN ASSISTANT

## 2021-02-12 PROCEDURE — 99213 OFFICE O/P EST LOW 20 MIN: CPT | Performed by: PHYSICIAN ASSISTANT

## 2021-02-12 RX ORDER — MELATONIN
1000 DAILY
COMMUNITY

## 2021-02-12 RX ORDER — PHENOL 1.4 %
600 AEROSOL, SPRAY (ML) MUCOUS MEMBRANE 2 TIMES DAILY WITH MEALS
COMMUNITY

## 2021-02-12 RX ORDER — DIPHENOXYLATE HYDROCHLORIDE AND ATROPINE SULFATE 2.5; .025 MG/1; MG/1
1 TABLET ORAL DAILY
COMMUNITY

## 2021-02-12 RX ORDER — UBIDECARENONE 75 MG
CAPSULE ORAL DAILY
COMMUNITY

## 2021-02-12 RX ORDER — ZINC GLUCONATE 50 MG
50 TABLET ORAL DAILY
COMMUNITY

## 2021-02-12 RX ORDER — BUPROPION HYDROCHLORIDE 150 MG/1
150 TABLET, EXTENDED RELEASE ORAL 2 TIMES DAILY
Qty: 60 TABLET | Refills: 2 | Status: SHIPPED | OUTPATIENT
Start: 2021-02-12

## 2021-02-12 RX ORDER — ASCORBIC ACID 500 MG
500 TABLET ORAL DAILY
COMMUNITY

## 2021-02-12 NOTE — ASSESSMENT & PLAN NOTE
Weight loss encouraged as I suspect his body habitus is likely contributing to his dyspnea exertion  Recommend lifestyle modification/is decreased caloric intake, healthier diet options and increased activity as tolerated

## 2021-02-12 NOTE — ASSESSMENT & PLAN NOTE
Smoking cessation discussed for 10 minutes of our phone visit today  He is motivated to quit smoking and I believe he is in the right mind set to do so  Recommend Wellbutrin  mg p o  b i d   Instructed him to take it with food  Side effects reviewed  Also recommend Nicorette 4 mg gum p r n  to use for breakthrough cravings while he [place] the road [place]

## 2021-02-12 NOTE — PROGRESS NOTES
Virtual Brief Visit    Assessment/Plan:    Problem List Items Addressed This Visit        Other    Cavitary lesion of lung - Primary     Reviewed results of CT chest 01/22/2021 with patient over the phone today  Informed him that size of cavitary lesion has decreased in size which is reassuring sign however malignancy is not totally excluded when he takes his smoking history into account  Recommend repeat CT chest without contrast in 3 months for continued surveillance  Could consider PET-CT in the future if still present  Relevant Orders    CT chest wo contrast    Cigarette nicotine dependence without complication      Smoking cessation discussed for 10 minutes of our phone visit today  He is motivated to quit smoking and I believe he is in the right mind set to do so  Recommend Wellbutrin  mg p o  b i d   Instructed him to take it with food  Side effects reviewed  Also recommend Nicorette 4 mg gum p r n  to use for breakthrough cravings while he is out [place] the road [place]  Relevant Medications    buPROPion (WELLBUTRIN SR) 150 mg 12 hr tablet    nicotine polacrilex (NICORETTE) 4 mg gum    Obesity (BMI 30-39  9)     Weight loss encouraged as I suspect his body habitus is likely contributing to his dyspnea exertion  Recommend lifestyle modification/is decreased caloric intake, healthier diet options and increased activity as tolerated  Reason for visit is   Chief Complaint   Patient presents with   [facility] follow up, bacterial pneumonia    Virtual Brief Visit        Encounter provider [employer] [facility], PA-C    Provider located at [address] [place] [id number] [phone]    Recent Visits  No visits were found meeting these conditions     Showing recent visits within past 7 days and meeting all other requirements     Today's Visits  Date Type Provider Dept   02/12/21 [facility] MARIA LUZ Aldana PA-C [facility]   Showing today's visits and meeting all other requirements     Future Appointments  No visits were found meeting these conditions  Showing future appointments within next 150 days and meeting all other requirements        After connecting through telephone, the patient was identified by name and date of birth  Nain Davidson was informed that this is a telemedicine visit and that the visit is being conducted through telephone  My office door was closed  No one else was in the room  He acknowledged consent and understanding of privacy and security of the platform  The patient has agreed to participate and understands he can discontinue the visit at any time  Patient is aware this is a billable service  Subjective    Verlinda Manner is a 52 y o  male Who presents via virtual visit for hospital follow-up and to go over CT results  HPI     Patient is a 52 old gentleman past medical history positive obesity who presented to the ED 12/05/2020 with complaints of URI with cough  He had a CT scan of the chest 12/07/2020 that revealed a cavitary lesion at the medial left lower lobe with increasing cavitation compared to CT done on day of admission  Patient initially was on cefepime and Flagyl then transition to a 2 week course of Augmentin  He was discharged 12/07/2020  He had repeat CT chest without contrast in January  Twenty[age] and is here to go over the results  Since discharge, patient states that symptoms does with her all but resolved  He reports improved cough and resolution of upper respiratory symptoms  He notes cough is chronic and he mostly attributes it to his smoking  Other pertinent pulmonary symptoms include dyspnea on exertion when climbing stairs but he admits that this is also chronic and he attributes that to his obesity  Denies fevers, chills, headache, dizziness  No night sweats abnormal weight loss or hemoptysis  Denies wheeze or chest tightness  No chest pain, palpitations, leg swelling  Denies GI symptoms  Such as nausea, vomiting, abdominal pain, diarrhea  Patient is a current everyday smoker stating that he smokes over a pack a day for last 35 years  He works as a   Past Medical History:   Diagnosis Date    Cavitary lesion of lung     Pneumonia        Past Surgical History:   Procedure Laterality Date    CHOLECYSTECTOMY         Current Outpatient Medications   Medication Sig Dispense Refill    ascorbic acid (VITAMIN C) 500 MG tablet Take 500 mg by mouth daily      calcium carbonate (OS-RENATE) 600 MG tablet Take 600 mg by mouth 2 (two) times a day with meals      cholecalciferol (VITAMIN D3) 1,000 units tablet Take 1,000 Units by mouth daily      cyanocobalamin (VITAMIN B-12) 100 mcg tablet Take by mouth daily      multivitamin (THERAGRAN) TABS Take 1 tablet by mouth daily      zinc gluconate 50 mg tablet Take 50 mg by mouth daily      buPROPion (WELLBUTRIN SR) 150 mg 12 hr tablet Take 1 tablet (150 mg total) by mouth 2 (two) times a day 60 tablet 2    nicotine polacrilex (NICORETTE) 4 mg gum Chew 1 each (4 mg total) as needed for smoking cessation 100 each 0     No current facility-administered medications for this visit  Allergies   Allergen Reactions    Contrast [Iodinated Diagnostic Agents] Angioedema       Review of Systems   Respiratory: Positive for cough and shortness of breath  All other systems reviewed and are negative  Vitals:    02/12/21 1424   SpO2: 98%   Weight: (!) 141 kg (310 lb)   Height: 6' 3" (1 905 m)      Imaging and other studies:  I personally reviewed pertinent reports  I personally reviewed pertinent films in PACS     CT chest 01/22/2021  Cavitary lesion in left lower lobe decreased in size from a 15 mm to 7 mm reduction in size and thickness of soft tissue rim as well   No evidence of  new nodules or other consolidations, fibrosis, or endobronchial lesions  I spent 25 minutes with patient today in which greater than 50% of the time was spent in counseling/coordination of care regarding Cavitary lesion of left lower lobe and nicotine dependence    VIRTUAL VISIT DISCLAIMER    Lisa Davidson acknowledges that he has consented to an online visit or consultation  He understands that the online visit is based solely on information provided by him, and that, in the absence of a face-to-face physical evaluation by the physician, the diagnosis he receives is both limited and provisional in terms of accuracy and completeness  This is not intended to replace a full medical face-to-face evaluation by the physician  Nilsa Duncan understands and accepts these terms

## 2021-03-03 ENCOUNTER — HOSPITAL ENCOUNTER (OUTPATIENT)
Dept: SLEEP CENTER | Facility: HOSPITAL | Age: 51
Discharge: HOME | End: 2021-03-03
Payer: COMMERCIAL

## 2021-03-03 DIAGNOSIS — G47.33 OSA (OBSTRUCTIVE SLEEP APNEA): ICD-10-CM

## 2021-03-03 PROCEDURE — G0399 HOME SLEEP TEST/TYPE 3 PORTA: HCPCS

## 2021-03-04 NOTE — PROGRESS NOTES
Home Sleep Study Documentation    Pre-Sleep Home Study:    Set-up and instructions performed by: KRISTIN Chambers RRT    Technician performed demonstration for Patient: yes    Return demonstration performed by Patient: yes    Written instructions provided to Patient: yes    Patient signed consent form: yes        Post-Sleep Home Study:    Additional comments by Patient:     Home Sleep Study Failed:no:    Failure reason: N/A    Reported or Detected: N/A    Scored by: JON Rand RPSGT

## 2021-03-08 ENCOUNTER — TELEPHONE (OUTPATIENT)
Dept: SLEEP CENTER | Facility: CLINIC | Age: 51
End: 2021-03-08

## 2021-03-08 NOTE — TELEPHONE ENCOUNTER
Left message for patient that sleep study resulted and will be discussed by DR Lori Jennings, phone # provided

## 2021-03-15 ENCOUNTER — TELEPHONE (OUTPATIENT)
Dept: FAMILY MEDICINE CLINIC | Facility: CLINIC | Age: 51
End: 2021-03-15

## 2021-03-15 DIAGNOSIS — G47.33 OBSTRUCTIVE SLEEP APNEA HYPOPNEA, SEVERE: Primary | ICD-10-CM

## 2021-03-15 NOTE — TELEPHONE ENCOUNTER
Spoke to patient on the phone informed him about sleep study result revealed severe obstructive sleep apnea  Patient states he was on CPAP couple years ago with inconsistent use  He may want to give it a try again  Will refer patient to sleep medicine for evaluation and education of proper CPAP use  Patient agreed with plan      Sharri Otoole MD  3/15/21  11:59am

## 2021-04-02 ENCOUNTER — TELEPHONE (OUTPATIENT)
Dept: UROLOGY | Facility: CLINIC | Age: 51
End: 2021-04-02

## 2021-04-02 NOTE — TELEPHONE ENCOUNTER
Wait List Patient    Called patient and left a voice message to move up appointment for May with either MD/AP  Patient to call back [phone] to move up appointment

## 2021-05-01 ENCOUNTER — OFFICE VISIT (OUTPATIENT)
Dept: URGENT CARE | Facility: CLINIC | Age: 51
End: 2021-05-01
Payer: COMMERCIAL

## 2021-05-01 VITALS
OXYGEN SATURATION: 95 % | RESPIRATION RATE: 22 BRPM | WEIGHT: 300 LBS | HEART RATE: 112 BPM | TEMPERATURE: 97 F | HEIGHT: 75 IN | BODY MASS INDEX: 37.3 KG/M2

## 2021-05-01 DIAGNOSIS — J02.9 PHARYNGITIS, UNSPECIFIED ETIOLOGY: Primary | ICD-10-CM

## 2021-05-01 PROCEDURE — G0382 LEV 3 HOSP TYPE B ED VISIT: HCPCS | Performed by: NURSE PRACTITIONER

## 2021-05-01 RX ORDER — AMOXICILLIN 875 MG/1
875 TABLET, COATED ORAL 2 TIMES DAILY
Qty: 20 TABLET | Refills: 0 | Status: SHIPPED | OUTPATIENT
Start: 2021-05-01 | End: 2021-05-11

## 2021-05-01 NOTE — PATIENT INSTRUCTIONS
Take the amoxicillin as ordered until completed  Eat yogurt or take a probiotic to restore good bacteria to your gut; this helps prevent stomach irritation/diarrhea while on an antibiotic  Drinking warm tea with honey and/or gargling salt water will help soothe your throat  Over the counter medications may also be used  Pharyngitis   AMBULATORY CARE:   Pharyngitis , or sore throat, is inflammation of the tissues and structures in your pharynx (throat)  Pharyngitis is most often caused by bacteria  It may also be caused by a cold or flu virus  Other causes include smoking, allergies, or acid reflux  Signs and symptoms that may occur with pharyngitis:   · Sore throat or pain when you swallow    · Fever, chills, and body aches    · Hoarse or raspy voice    · Cough, runny or stuffy nose, itchy or watery eyes    · Headache    · Upset stomach and loss of appetite    · Mild neck stiffness    · Swollen glands that feel like hard lumps when you touch your neck    · White and yellow pus-filled blisters in the back of your throat    Call 911 for any of the following:   · You have trouble breathing or swallowing because your throat is swollen or sore  Seek care immediately if:   · You are drooling because it hurts too much to swallow  · Your fever is higher than 102? F (39?C) or lasts longer than 3 days  · You are confused  · You taste blood in your throat  Contact your healthcare provider if:   · Your throat pain gets worse  · You have a painful lump in your throat that does not go away after 5 days  · Your symptoms do not improve after 5 days  · You have questions or concerns about your condition or care  Treatment for pharyngitis:  Viral pharyngitis will go away on its own without treatment  Your sore throat should start to feel better in 3 to 5 days for both viral and bacterial infections  You may need any of the following:  · Antibiotics  treat a bacterial infection      · NSAIDs , such as ibuprofen, help decrease swelling, pain, and fever  NSAIDs can cause stomach bleeding or kidney problems in certain people  If you take blood thinner medicine, always ask your healthcare provider if NSAIDs are safe for you  Always read the medicine label and follow directions  · Acetaminophen  decreases pain and fever  It is available without a doctor's order  Ask how much to take and how often to take it  Follow directions  Acetaminophen can cause liver damage if not taken correctly  Manage your symptoms:   · Gargle salt water  Mix ¼ teaspoon salt in an 8 ounce glass of warm water and gargle  This may help decrease swelling in your throat  · Drink liquids as directed  You may need to drink more liquids than usual  Liquids may help soothe your throat and prevent dehydration  Ask how much liquid to drink each day and which liquids are best for you  · Use a cool-steam humidifier  to help moisten the air in your room and calm your cough  · Soothe your throat  with cough drops, ice, soft foods, or popsicles  Prevent the spread of pharyngitis:  Cover your mouth and nose when you cough or sneeze  Do not share food or drinks  Wash your hands often  Use soap and water  If soap and water are unavailable, use an alcohol based hand   Follow up with your healthcare provider as directed:  Write down your questions so you remember to ask them during your visits  © Copyright [address] Information is for End User's use only and may not be sold, redistributed or otherwise used for commercial purposes  All illustrations and images included in CareNotes® are the copyrighted property of A D A M , [employer]  or [address] [place], [place] [address]   The above information is an  only  It is not intended as medical advice for individual conditions or treatments  Talk to your doctor, nurse or pharmacist before following any medical regimen to see if it is safe and effective for you

## 2021-05-01 NOTE — PROGRESS NOTES
[facility]        NAME: Shaheen Yeung is a 52 y o  male  : 1970    MRN: [id number]  DATE: May 1, 2021  TIME: 1:39 PM      Assessment and Plan     Pharyngitis, unspecified etiology [J02 9]  1  Pharyngitis, unspecified etiology  amoxicillin (AMOXIL) 875 mg tablet         Patient Instructions     Patient Instructions   Take the amoxicillin as ordered until completed  Eat yogurt or take a probiotic to restore good bacteria to your gut; this helps prevent stomach irritation/diarrhea while on an antibiotic  Drinking warm tea with honey and/or gargling salt water will help soothe your throat  Over the counter medications may also be used  Pharyngitis   AMBULATORY CARE:   Pharyngitis , or sore throat, is inflammation of the tissues and structures in your pharynx (throat)  Pharyngitis is most often caused by bacteria  It may also be caused by a cold or flu virus  Other causes include smoking, allergies, or acid reflux  Signs and symptoms that may occur with pharyngitis:   · Sore throat or pain when you swallow    · Fever, chills, and body aches    · Hoarse or raspy voice    · Cough, runny or stuffy nose, itchy or watery eyes    · Headache    · Upset stomach and loss of appetite    · Mild neck stiffness    · Swollen glands that feel like hard lumps when you touch your neck    · White and yellow pus-filled blisters in the back of your throat    Call 911 for any of the following:   · You have trouble breathing or swallowing because your throat is swollen or sore  Seek care immediately if:   · You are drooling because it hurts too much to swallow  · Your fever is higher than 102? F (39?C) or lasts longer than 3 days  · You are confused  · You taste blood in your throat  Contact your healthcare provider if:   · Your throat pain gets worse  · You have a painful lump in your throat that does not go away after 5 days  · Your symptoms do not improve after 5 days      · You have questions or concerns about your condition or care  Treatment for pharyngitis:  Viral pharyngitis will go away on its own without treatment  Your sore throat should start to feel better in 3 to 5 days for both viral and bacterial infections  You may need any of the following:  · Antibiotics  treat a bacterial infection  · NSAIDs , such as ibuprofen, help decrease swelling, pain, and fever  NSAIDs can cause stomach bleeding or kidney problems in certain people  If you take blood thinner medicine, always ask your healthcare provider if NSAIDs are safe for you  Always read the medicine label and follow directions  · Acetaminophen  decreases pain and fever  It is available without a doctor's order  Ask how much to take and how often to take it  Follow directions  Acetaminophen can cause liver damage if not taken correctly  Manage your symptoms:   · Gargle salt water  Mix ¼ teaspoon salt in an 8 ounce glass of warm water and gargle  This may help decrease swelling in your throat  · Drink liquids as directed  You may need to drink more liquids than usual  Liquids may help soothe your throat and prevent dehydration  Ask how much liquid to drink each day and which liquids are best for you  · Use a cool-steam humidifier  to help moisten the air in your room and calm your cough  · Soothe your throat  with cough drops, ice, soft foods, or popsicles  Prevent the spread of pharyngitis:  Cover your mouth and nose when you cough or sneeze  Do not share food or drinks  Wash your hands often  Use soap and water  If soap and water are unavailable, use an alcohol based hand   Follow up with your healthcare provider as directed:  Write down your questions so you remember to ask them during your visits  © Copyright [address] Information is for End User's use only and may not be sold, redistributed or otherwise used for commercial purposes   All illustrations and images included in CareNotes® are the copyrighted property of [employer] M [employer]  or [address] [place], [place] [address]   The above information is an  only  It is not intended as medical advice for individual conditions or treatments  Talk to your doctor, nurse or pharmacist before following any medical regimen to see if it is safe and effective for you  Follow up with PCP in 3-5 days  Proceed to  ER if symptoms worsen  Chief Complaint     Chief Complaint   Patient presents with    Sinusitis     sinus congestion, sore throat for 4 days         History of Present Illness     Patient presents reporting a 4 day history of illness  Began with a severe sore throat that got worse not better  He has also developed sinus congestion and a headache  He states that hurts to swallow  He denies any specific sick contacts, but states that he has high exposure to the public since he works as a   He is scheduled to get his 1st COVID vaccine later today  He is a smoker  No history of asthma or bronchitis  Remote history of pneumonia  Review of Systems     Review of Systems   HENT: Positive for congestion, sore throat and trouble swallowing  Respiratory: Negative  Gastrointestinal: Negative  Neurological: Positive for headaches  All other systems reviewed and are negative          Current Medications       Current Outpatient Medications:     ascorbic acid (VITAMIN C) 500 MG tablet, Take 500 mg by mouth daily, Disp: , Rfl:     calcium carbonate (OS-RENATE) 600 MG tablet, Take 600 mg by mouth 2 (two) times a day with meals, Disp: , Rfl:     cholecalciferol (VITAMIN D3) 1,000 units tablet, Take 1,000 Units by mouth daily, Disp: , Rfl:     cyanocobalamin (VITAMIN B-12) 100 mcg tablet, Take by mouth daily, Disp: , Rfl:     multivitamin (THERAGRAN) TABS, Take 1 tablet by mouth daily, Disp: , Rfl:     zinc gluconate 50 mg tablet, Take 50 mg by mouth daily, Disp: , Rfl:     amoxicillin (AMOXIL) 875 mg tablet, Take 1 tablet (875 mg total) by mouth 2 (two) times a day for 10 days, Disp: 20 tablet, Rfl: 0    buPROPion (WELLBUTRIN SR) 150 mg 12 hr tablet, Take 1 tablet (150 mg total) by mouth 2 (two) times a day (Patient not taking: Reported on 5/1/2021), Disp: 60 tablet, Rfl: 2    nicotine polacrilex (NICORETTE) 4 mg gum, Chew 1 each (4 mg total) as needed for smoking cessation (Patient not taking: Reported on 5/1/2021), Disp: 100 each, Rfl: 0    Current Allergies     Allergies as of 05/01/2021 - Reviewed 05/01/2021   Allergen Reaction Noted    Contrast [iodinated diagnostic agents] Angioedema 12/01/2020              The following portions of the patient's history were reviewed and updated as appropriate: allergies, current medications, past family history, past medical history, past social history, past surgical history and problem list      Past Medical History:   Diagnosis Date    Cavitary lesion of lung     Pneumonia        Past Surgical History:   Procedure Laterality Date    CHOLECYSTECTOMY         Family History   Problem Relation Age of Onset    Cancer Family          Medications have been verified  Objective     Pulse (!) 112   Temp (!) 97 °F (36 1 °C) (Temporal)   Resp 22   Ht 6' 3" (1 905 m)   Wt 136 kg (300 lb)   SpO2 95%   BMI 37 50 kg/m²   No LMP for male patient  Physical Exam     Physical Exam  Vitals signs and nursing note reviewed  Constitutional:       General: He is not in acute distress  Appearance: Normal appearance  He is well-developed  He is not ill-appearing, toxic-appearing or diaphoretic  HENT:      Head: Normocephalic and atraumatic  Nose: Mucosal edema and congestion present  Right Sinus: Frontal sinus tenderness present  No maxillary sinus tenderness  Left Sinus: Frontal sinus tenderness present  No maxillary sinus tenderness  Mouth/Throat:      Mouth: Mucous membranes are moist       Pharynx: Uvula midline   Oropharyngeal exudate and posterior oropharyngeal erythema present  Tonsils: Tonsillar exudate present  No tonsillar abscesses  3+ on the right  3+ on the left  Eyes:      Pupils: Pupils are equal, round, and reactive to light  Neck:      Musculoskeletal: Normal range of motion and neck supple  Cardiovascular:      Rate and Rhythm: Normal rate and regular rhythm  Heart sounds: Normal heart sounds  No murmur  No friction rub  No gallop  Pulmonary:      Effort: Pulmonary effort is normal  No tachypnea, bradypnea, accessory muscle usage or respiratory distress  Breath sounds: Normal breath sounds  No stridor  No decreased breath sounds, wheezing, rhonchi or rales  Chest:      Chest wall: No tenderness  Abdominal:      General: Bowel sounds are normal  There is no distension  Palpations: Abdomen is soft  Tenderness: There is no abdominal tenderness  Musculoskeletal: Normal range of motion  Lymphadenopathy:      Cervical: Cervical adenopathy present  Skin:     General: Skin is warm and dry  Capillary Refill: Capillary refill takes less than 2 seconds  Neurological:      General: No focal deficit present  Mental Status: He is alert and oriented to person, place, and time  Psychiatric:         Mood and Affect: Mood normal          Behavior: Behavior normal  Behavior is cooperative  Thought Content:  Thought content normal          Judgment: Judgment normal

## 2021-12-06 ENCOUNTER — OFFICE VISIT (OUTPATIENT)
Dept: URGENT CARE | Facility: CLINIC | Age: 51
End: 2021-12-06
Payer: COMMERCIAL

## 2021-12-06 VITALS
OXYGEN SATURATION: 97 % | HEART RATE: 85 BPM | DIASTOLIC BLOOD PRESSURE: 82 MMHG | SYSTOLIC BLOOD PRESSURE: 140 MMHG | RESPIRATION RATE: 18 BRPM | TEMPERATURE: 98.2 F

## 2021-12-06 DIAGNOSIS — H61.21 IMPACTED CERUMEN OF RIGHT EAR: ICD-10-CM

## 2021-12-06 DIAGNOSIS — J01.40 ACUTE PANSINUSITIS, RECURRENCE NOT SPECIFIED: Primary | ICD-10-CM

## 2021-12-06 PROCEDURE — U0005 INFEC AGEN DETEC AMPLI PROBE: HCPCS | Performed by: NURSE PRACTITIONER

## 2021-12-06 PROCEDURE — 69209 REMOVE IMPACTED EAR WAX UNI: CPT | Performed by: NURSE PRACTITIONER

## 2021-12-06 PROCEDURE — U0003 INFECTIOUS AGENT DETECTION BY NUCLEIC ACID (DNA OR RNA); SEVERE ACUTE RESPIRATORY SYNDROME CORONAVIRUS 2 (SARS-COV-2) (CORONAVIRUS DISEASE [COVID-19]), AMPLIFIED PROBE TECHNIQUE, MAKING USE OF HIGH THROUGHPUT TECHNOLOGIES AS DESCRIBED BY CMS-2020-01-R: HCPCS | Performed by: NURSE PRACTITIONER

## 2021-12-06 PROCEDURE — 99213 OFFICE O/P EST LOW 20 MIN: CPT | Performed by: NURSE PRACTITIONER

## 2021-12-06 RX ORDER — AMOXICILLIN AND CLAVULANATE POTASSIUM 875; 125 MG/1; MG/1
1 TABLET, FILM COATED ORAL EVERY 12 HOURS SCHEDULED
Qty: 20 TABLET | Refills: 0 | Status: SHIPPED | OUTPATIENT
Start: 2021-12-06 | End: 2021-12-16

## 2021-12-07 ENCOUNTER — TELEPHONE (OUTPATIENT)
Dept: URGENT CARE | Facility: CLINIC | Age: 51
End: 2021-12-07

## 2021-12-07 LAB — SARS-COV-2 RNA RESP QL NAA+PROBE: NEGATIVE

## 2021-12-29 ENCOUNTER — APPOINTMENT (OUTPATIENT)
Dept: LAB | Facility: MEDICAL CENTER | Age: 51
End: 2021-12-29
Payer: COMMERCIAL

## 2021-12-29 DIAGNOSIS — R53.83 OTHER FATIGUE: ICD-10-CM

## 2021-12-29 DIAGNOSIS — R73.03 PRE-DIABETES: ICD-10-CM

## 2021-12-29 DIAGNOSIS — E55.9 VITAMIN D DEFICIENCY: ICD-10-CM

## 2021-12-29 LAB
25(OH)D3 SERPL-MCNC: 16.8 NG/ML (ref 30–100)
ALBUMIN SERPL BCP-MCNC: 3.4 G/DL (ref 3.5–5)
ALP SERPL-CCNC: 93 U/L (ref 46–116)
ALT SERPL W P-5'-P-CCNC: 30 U/L (ref 12–78)
ANION GAP SERPL CALCULATED.3IONS-SCNC: 6 MMOL/L (ref 4–13)
AST SERPL W P-5'-P-CCNC: 17 U/L (ref 5–45)
BILIRUB SERPL-MCNC: 0.28 MG/DL (ref 0.2–1)
BUN SERPL-MCNC: 16 MG/DL (ref 5–25)
CALCIUM ALBUM COR SERPL-MCNC: 9.9 MG/DL (ref 8.3–10.1)
CALCIUM SERPL-MCNC: 9.4 MG/DL (ref 8.3–10.1)
CHLORIDE SERPL-SCNC: 110 MMOL/L (ref 100–108)
CHOLEST SERPL-MCNC: 184 MG/DL
CO2 SERPL-SCNC: 24 MMOL/L (ref 21–32)
CREAT SERPL-MCNC: 0.87 MG/DL (ref 0.6–1.3)
ERYTHROCYTE [DISTWIDTH] IN BLOOD BY AUTOMATED COUNT: 13.3 % (ref 11.6–15.1)
GFR SERPL CREATININE-BSD FRML MDRD: 101 ML/MIN/1.73SQ M
GLUCOSE P FAST SERPL-MCNC: 90 MG/DL (ref 65–99)
HCT VFR BLD AUTO: 48.7 % (ref 36.5–49.3)
HDLC SERPL-MCNC: 30 MG/DL
HGB BLD-MCNC: 15.7 G/DL (ref 12–17)
INSULIN SERPL-ACNC: 54.1 MU/L (ref 3–25)
LDLC SERPL CALC-MCNC: 122 MG/DL (ref 0–100)
MCH RBC QN AUTO: 30.5 PG (ref 26.8–34.3)
MCHC RBC AUTO-ENTMCNC: 32.2 G/DL (ref 31.4–37.4)
MCV RBC AUTO: 95 FL (ref 82–98)
NONHDLC SERPL-MCNC: 154 MG/DL
PLATELET # BLD AUTO: 254 THOUSANDS/UL (ref 149–390)
PMV BLD AUTO: 10.8 FL (ref 8.9–12.7)
POTASSIUM SERPL-SCNC: 4.3 MMOL/L (ref 3.5–5.3)
PROT SERPL-MCNC: 7.5 G/DL (ref 6.4–8.2)
PTH-INTACT SERPL-MCNC: 61.8 PG/ML (ref 18.4–80.1)
RBC # BLD AUTO: 5.14 MILLION/UL (ref 3.88–5.62)
SODIUM SERPL-SCNC: 140 MMOL/L (ref 136–145)
TESTOST SERPL-MCNC: 241 NG/DL (ref 113–1065)
TRIGL SERPL-MCNC: 160 MG/DL
TSH SERPL DL<=0.05 MIU/L-ACNC: 2.49 UIU/ML (ref 0.36–3.74)
WBC # BLD AUTO: 7.7 THOUSAND/UL (ref 4.31–10.16)

## 2021-12-29 PROCEDURE — 80053 COMPREHEN METABOLIC PANEL: CPT

## 2021-12-29 PROCEDURE — 85027 COMPLETE CBC AUTOMATED: CPT

## 2021-12-29 PROCEDURE — 36415 COLL VENOUS BLD VENIPUNCTURE: CPT

## 2021-12-29 PROCEDURE — 84403 ASSAY OF TOTAL TESTOSTERONE: CPT

## 2021-12-29 PROCEDURE — 83525 ASSAY OF INSULIN: CPT

## 2021-12-29 PROCEDURE — 80061 LIPID PANEL: CPT

## 2021-12-29 PROCEDURE — 82306 VITAMIN D 25 HYDROXY: CPT

## 2021-12-29 PROCEDURE — 83970 ASSAY OF PARATHORMONE: CPT

## 2021-12-29 PROCEDURE — 84443 ASSAY THYROID STIM HORMONE: CPT

## 2022-10-12 PROBLEM — A41.9 SEPSIS (HCC): Status: RESOLVED | Noted: 2020-12-05 | Resolved: 2022-10-12

## 2023-11-11 ENCOUNTER — APPOINTMENT (OUTPATIENT)
Dept: LAB | Facility: MEDICAL CENTER | Age: 53
End: 2023-11-11
Payer: COMMERCIAL

## 2023-11-11 DIAGNOSIS — E11.9 TYPE 2 DIABETES MELLITUS WITHOUT COMPLICATION, UNSPECIFIED WHETHER LONG TERM INSULIN USE (HCC): ICD-10-CM

## 2023-11-11 DIAGNOSIS — Z13.9 SCREENING FOR UNSPECIFIED CONDITION: ICD-10-CM

## 2023-11-11 DIAGNOSIS — E78.5 HYPERLIPIDEMIA, UNSPECIFIED HYPERLIPIDEMIA TYPE: ICD-10-CM

## 2023-11-11 LAB
ALBUMIN SERPL BCP-MCNC: 4.1 G/DL (ref 3.5–5)
ALP SERPL-CCNC: 71 U/L (ref 34–104)
ALT SERPL W P-5'-P-CCNC: 16 U/L (ref 7–52)
ANION GAP SERPL CALCULATED.3IONS-SCNC: 6 MMOL/L
AST SERPL W P-5'-P-CCNC: 16 U/L (ref 13–39)
BASOPHILS # BLD AUTO: 0.07 THOUSANDS/ÂΜL (ref 0–0.1)
BASOPHILS NFR BLD AUTO: 1 % (ref 0–1)
BILIRUB SERPL-MCNC: 0.36 MG/DL (ref 0.2–1)
BUN SERPL-MCNC: 18 MG/DL (ref 5–25)
CALCIUM SERPL-MCNC: 9.3 MG/DL (ref 8.4–10.2)
CHLORIDE SERPL-SCNC: 108 MMOL/L (ref 96–108)
CHOLEST SERPL-MCNC: 150 MG/DL
CO2 SERPL-SCNC: 27 MMOL/L (ref 21–32)
CREAT SERPL-MCNC: 0.83 MG/DL (ref 0.6–1.3)
EOSINOPHIL # BLD AUTO: 0.26 THOUSAND/ÂΜL (ref 0–0.61)
EOSINOPHIL NFR BLD AUTO: 3 % (ref 0–6)
ERYTHROCYTE [DISTWIDTH] IN BLOOD BY AUTOMATED COUNT: 13.7 % (ref 11.6–15.1)
EST. AVERAGE GLUCOSE BLD GHB EST-MCNC: 120 MG/DL
GFR SERPL CREATININE-BSD FRML MDRD: 101 ML/MIN/1.73SQ M
GLUCOSE P FAST SERPL-MCNC: 94 MG/DL (ref 65–99)
HBA1C MFR BLD: 5.8 %
HCT VFR BLD AUTO: 48.5 % (ref 36.5–49.3)
HDLC SERPL-MCNC: 39 MG/DL
HGB BLD-MCNC: 15.6 G/DL (ref 12–17)
IMM GRANULOCYTES # BLD AUTO: 0.02 THOUSAND/UL (ref 0–0.2)
IMM GRANULOCYTES NFR BLD AUTO: 0 % (ref 0–2)
LDLC SERPL CALC-MCNC: 99 MG/DL (ref 0–100)
LYMPHOCYTES # BLD AUTO: 2.32 THOUSANDS/ÂΜL (ref 0.6–4.47)
LYMPHOCYTES NFR BLD AUTO: 30 % (ref 14–44)
MCH RBC QN AUTO: 30.1 PG (ref 26.8–34.3)
MCHC RBC AUTO-ENTMCNC: 32.2 G/DL (ref 31.4–37.4)
MCV RBC AUTO: 94 FL (ref 82–98)
MONOCYTES # BLD AUTO: 0.59 THOUSAND/ÂΜL (ref 0.17–1.22)
MONOCYTES NFR BLD AUTO: 8 % (ref 4–12)
NEUTROPHILS # BLD AUTO: 4.51 THOUSANDS/ÂΜL (ref 1.85–7.62)
NEUTS SEG NFR BLD AUTO: 58 % (ref 43–75)
NONHDLC SERPL-MCNC: 111 MG/DL
NRBC BLD AUTO-RTO: 0 /100 WBCS
PLATELET # BLD AUTO: 249 THOUSANDS/UL (ref 149–390)
PMV BLD AUTO: 10.7 FL (ref 8.9–12.7)
POTASSIUM SERPL-SCNC: 5 MMOL/L (ref 3.5–5.3)
PROT SERPL-MCNC: 7.1 G/DL (ref 6.4–8.4)
PSA SERPL-MCNC: 0.64 NG/ML (ref 0–4)
RBC # BLD AUTO: 5.18 MILLION/UL (ref 3.88–5.62)
SODIUM SERPL-SCNC: 141 MMOL/L (ref 135–147)
T3 SERPL-MCNC: 1.2 NG/ML
T4 FREE SERPL-MCNC: 0.81 NG/DL (ref 0.61–1.12)
TRIGL SERPL-MCNC: 58 MG/DL
TSH SERPL DL<=0.05 MIU/L-ACNC: 1.7 UIU/ML (ref 0.45–4.5)
WBC # BLD AUTO: 7.77 THOUSAND/UL (ref 4.31–10.16)

## 2023-11-11 PROCEDURE — 80053 COMPREHEN METABOLIC PANEL: CPT

## 2023-11-11 PROCEDURE — 85025 COMPLETE CBC W/AUTO DIFF WBC: CPT

## 2023-11-11 PROCEDURE — 83036 HEMOGLOBIN GLYCOSYLATED A1C: CPT

## 2023-11-11 PROCEDURE — 84480 ASSAY TRIIODOTHYRONINE (T3): CPT

## 2023-11-11 PROCEDURE — 80061 LIPID PANEL: CPT

## 2023-11-11 PROCEDURE — 84439 ASSAY OF FREE THYROXINE: CPT

## 2023-11-11 PROCEDURE — G0103 PSA SCREENING: HCPCS

## 2023-11-11 PROCEDURE — 36415 COLL VENOUS BLD VENIPUNCTURE: CPT

## 2023-11-11 PROCEDURE — 84443 ASSAY THYROID STIM HORMONE: CPT

## 2024-10-12 ENCOUNTER — APPOINTMENT (OUTPATIENT)
Dept: LAB | Facility: MEDICAL CENTER | Age: 54
End: 2024-10-12
Payer: COMMERCIAL

## 2024-10-12 DIAGNOSIS — R53.83 FATIGUE, UNSPECIFIED TYPE: ICD-10-CM

## 2024-10-12 DIAGNOSIS — R35.0 URINARY FREQUENCY: ICD-10-CM

## 2024-10-12 DIAGNOSIS — E13.9 DIABETES MELLITUS OF OTHER TYPE WITHOUT COMPLICATION, UNSPECIFIED WHETHER LONG TERM INSULIN USE (HCC): ICD-10-CM

## 2024-10-12 DIAGNOSIS — Z12.5 SPECIAL SCREENING, PROSTATE CANCER: ICD-10-CM

## 2024-10-12 LAB
25(OH)D3 SERPL-MCNC: 24.7 NG/ML (ref 30–100)
ALBUMIN SERPL BCG-MCNC: 4 G/DL (ref 3.5–5)
ALP SERPL-CCNC: 76 U/L (ref 34–104)
ALT SERPL W P-5'-P-CCNC: 15 U/L (ref 7–52)
ANION GAP SERPL CALCULATED.3IONS-SCNC: 7 MMOL/L (ref 4–13)
AST SERPL W P-5'-P-CCNC: 14 U/L (ref 13–39)
BACTERIA UR QL AUTO: ABNORMAL /HPF
BASOPHILS # BLD AUTO: 0.08 THOUSANDS/ΜL (ref 0–0.1)
BASOPHILS NFR BLD AUTO: 1 % (ref 0–1)
BILIRUB SERPL-MCNC: 0.34 MG/DL (ref 0.2–1)
BILIRUB UR QL STRIP: NEGATIVE
BUN SERPL-MCNC: 16 MG/DL (ref 5–25)
CALCIUM SERPL-MCNC: 8.8 MG/DL (ref 8.4–10.2)
CHLORIDE SERPL-SCNC: 108 MMOL/L (ref 96–108)
CHOLEST SERPL-MCNC: 145 MG/DL
CLARITY UR: CLEAR
CO2 SERPL-SCNC: 23 MMOL/L (ref 21–32)
COLOR UR: ABNORMAL
CREAT SERPL-MCNC: 0.77 MG/DL (ref 0.6–1.3)
CREAT UR-MCNC: 84.5 MG/DL
EOSINOPHIL # BLD AUTO: 0.33 THOUSAND/ΜL (ref 0–0.61)
EOSINOPHIL NFR BLD AUTO: 4 % (ref 0–6)
ERYTHROCYTE [DISTWIDTH] IN BLOOD BY AUTOMATED COUNT: 13.3 % (ref 11.6–15.1)
EST. AVERAGE GLUCOSE BLD GHB EST-MCNC: 114 MG/DL
GFR SERPL CREATININE-BSD FRML MDRD: 104 ML/MIN/1.73SQ M
GLUCOSE P FAST SERPL-MCNC: 91 MG/DL (ref 65–99)
GLUCOSE UR STRIP-MCNC: NEGATIVE MG/DL
HBA1C MFR BLD: 5.6 %
HCT VFR BLD AUTO: 46.6 % (ref 36.5–49.3)
HDLC SERPL-MCNC: 31 MG/DL
HGB BLD-MCNC: 15.6 G/DL (ref 12–17)
HGB UR QL STRIP.AUTO: ABNORMAL
IMM GRANULOCYTES # BLD AUTO: 0.02 THOUSAND/UL (ref 0–0.2)
IMM GRANULOCYTES NFR BLD AUTO: 0 % (ref 0–2)
INSULIN SERPL-ACNC: 18.13 UIU/ML
IRON SATN MFR SERPL: 29 % (ref 15–50)
IRON SERPL-MCNC: 111 UG/DL (ref 50–212)
KETONES UR STRIP-MCNC: NEGATIVE MG/DL
LDLC SERPL CALC-MCNC: 95 MG/DL (ref 0–100)
LEUKOCYTE ESTERASE UR QL STRIP: NEGATIVE
LYMPHOCYTES # BLD AUTO: 2.58 THOUSANDS/ΜL (ref 0.6–4.47)
LYMPHOCYTES NFR BLD AUTO: 33 % (ref 14–44)
MCH RBC QN AUTO: 31.1 PG (ref 26.8–34.3)
MCHC RBC AUTO-ENTMCNC: 33.5 G/DL (ref 31.4–37.4)
MCV RBC AUTO: 93 FL (ref 82–98)
MICROALBUMIN UR-MCNC: <7 MG/L
MONOCYTES # BLD AUTO: 0.69 THOUSAND/ΜL (ref 0.17–1.22)
MONOCYTES NFR BLD AUTO: 9 % (ref 4–12)
NEUTROPHILS # BLD AUTO: 4.22 THOUSANDS/ΜL (ref 1.85–7.62)
NEUTS SEG NFR BLD AUTO: 53 % (ref 43–75)
NITRITE UR QL STRIP: NEGATIVE
NON-SQ EPI CELLS URNS QL MICRO: ABNORMAL /HPF
NONHDLC SERPL-MCNC: 114 MG/DL
NRBC BLD AUTO-RTO: 0 /100 WBCS
PH UR STRIP.AUTO: 5.5 [PH]
PLATELET # BLD AUTO: 254 THOUSANDS/UL (ref 149–390)
PMV BLD AUTO: 10.3 FL (ref 8.9–12.7)
POTASSIUM SERPL-SCNC: 4.4 MMOL/L (ref 3.5–5.3)
PROT SERPL-MCNC: 7 G/DL (ref 6.4–8.4)
PROT UR STRIP-MCNC: NEGATIVE MG/DL
PSA SERPL-MCNC: 0.65 NG/ML (ref 0–4)
RBC # BLD AUTO: 5.01 MILLION/UL (ref 3.88–5.62)
RBC #/AREA URNS AUTO: ABNORMAL /HPF
SODIUM SERPL-SCNC: 138 MMOL/L (ref 135–147)
SP GR UR STRIP.AUTO: 1.02 (ref 1–1.03)
T3 SERPL-MCNC: 1.2 NG/ML
T4 FREE SERPL-MCNC: 0.85 NG/DL (ref 0.61–1.12)
TIBC SERPL-MCNC: 380 UG/DL (ref 250–450)
TRIGL SERPL-MCNC: 95 MG/DL
TSH SERPL DL<=0.05 MIU/L-ACNC: 2.18 UIU/ML (ref 0.45–4.5)
UIBC SERPL-MCNC: 269 UG/DL (ref 155–355)
UROBILINOGEN UR STRIP-ACNC: <2 MG/DL
WBC # BLD AUTO: 7.92 THOUSAND/UL (ref 4.31–10.16)
WBC #/AREA URNS AUTO: ABNORMAL /HPF

## 2024-10-12 PROCEDURE — 83036 HEMOGLOBIN GLYCOSYLATED A1C: CPT

## 2024-10-12 PROCEDURE — 85025 COMPLETE CBC W/AUTO DIFF WBC: CPT

## 2024-10-12 PROCEDURE — G0103 PSA SCREENING: HCPCS

## 2024-10-12 PROCEDURE — 83540 ASSAY OF IRON: CPT

## 2024-10-12 PROCEDURE — 82570 ASSAY OF URINE CREATININE: CPT

## 2024-10-12 PROCEDURE — 82043 UR ALBUMIN QUANTITATIVE: CPT

## 2024-10-12 PROCEDURE — 80061 LIPID PANEL: CPT

## 2024-10-12 PROCEDURE — 83525 ASSAY OF INSULIN: CPT

## 2024-10-12 PROCEDURE — 83550 IRON BINDING TEST: CPT

## 2024-10-12 PROCEDURE — 82306 VITAMIN D 25 HYDROXY: CPT

## 2024-10-12 PROCEDURE — 84439 ASSAY OF FREE THYROXINE: CPT

## 2024-10-12 PROCEDURE — 84480 ASSAY TRIIODOTHYRONINE (T3): CPT

## 2024-10-12 PROCEDURE — 81001 URINALYSIS AUTO W/SCOPE: CPT

## 2024-10-12 PROCEDURE — 80053 COMPREHEN METABOLIC PANEL: CPT

## 2024-10-12 PROCEDURE — 84443 ASSAY THYROID STIM HORMONE: CPT

## 2024-10-12 PROCEDURE — 36415 COLL VENOUS BLD VENIPUNCTURE: CPT

## 2025-02-23 ENCOUNTER — APPOINTMENT (INPATIENT)
Dept: CT IMAGING | Facility: HOSPITAL | Age: 55
DRG: 392 | End: 2025-02-23
Payer: COMMERCIAL

## 2025-02-23 ENCOUNTER — HOSPITAL ENCOUNTER (INPATIENT)
Facility: HOSPITAL | Age: 55
LOS: 1 days | Discharge: HOME | DRG: 392 | End: 2025-02-24
Attending: EMERGENCY MEDICINE | Admitting: FAMILY MEDICINE
Payer: COMMERCIAL

## 2025-02-23 ENCOUNTER — APPOINTMENT (INPATIENT)
Dept: NON INVASIVE DIAGNOSTICS | Facility: HOSPITAL | Age: 55
DRG: 392 | End: 2025-02-23
Payer: COMMERCIAL

## 2025-02-23 ENCOUNTER — APPOINTMENT (EMERGENCY)
Dept: RADIOLOGY | Facility: HOSPITAL | Age: 55
DRG: 392 | End: 2025-02-23
Payer: COMMERCIAL

## 2025-02-23 ENCOUNTER — APPOINTMENT (EMERGENCY)
Dept: CT IMAGING | Facility: HOSPITAL | Age: 55
DRG: 392 | End: 2025-02-23
Payer: COMMERCIAL

## 2025-02-23 DIAGNOSIS — Z72.0 TOBACCO USE: ICD-10-CM

## 2025-02-23 DIAGNOSIS — E66.9 OBESITY (BMI 30-39.9): ICD-10-CM

## 2025-02-23 DIAGNOSIS — I31.39 PERICARDIAL EFFUSION: Primary | ICD-10-CM

## 2025-02-23 DIAGNOSIS — J43.9 PULMONARY EMPHYSEMA, UNSPECIFIED EMPHYSEMA TYPE (HCC): ICD-10-CM

## 2025-02-23 DIAGNOSIS — G47.33 OSA (OBSTRUCTIVE SLEEP APNEA): ICD-10-CM

## 2025-02-23 DIAGNOSIS — R07.9 CHEST PAIN: ICD-10-CM

## 2025-02-23 DIAGNOSIS — R19.7 DIARRHEA: ICD-10-CM

## 2025-02-23 PROBLEM — R09.89: Status: ACTIVE | Noted: 2025-02-23

## 2025-02-23 PROBLEM — K52.9 ACUTE GASTROENTERITIS: Status: ACTIVE | Noted: 2025-02-23

## 2025-02-23 PROBLEM — G93.89 ENCEPHALOMALACIA ON IMAGING STUDY: Status: ACTIVE | Noted: 2025-02-23

## 2025-02-23 LAB
2HR DELTA HS TROPONIN: 0 NG/L
ALBUMIN SERPL BCG-MCNC: 4.4 G/DL (ref 3.5–5)
ALP SERPL-CCNC: 72 U/L (ref 34–104)
ALT SERPL W P-5'-P-CCNC: 49 U/L (ref 7–52)
ANION GAP SERPL CALCULATED.3IONS-SCNC: 12 MMOL/L (ref 4–13)
APTT PPP: 112 SECONDS (ref 23–34)
APTT PPP: 27 SECONDS (ref 23–34)
AST SERPL W P-5'-P-CCNC: 42 U/L (ref 13–39)
BASOPHILS # BLD AUTO: 0.04 THOUSANDS/ÂΜL (ref 0–0.1)
BASOPHILS NFR BLD AUTO: 1 % (ref 0–1)
BILIRUB SERPL-MCNC: 0.38 MG/DL (ref 0.2–1)
BNP SERPL-MCNC: 13 PG/ML (ref 0–100)
BUN SERPL-MCNC: 22 MG/DL (ref 5–25)
CALCIUM SERPL-MCNC: 8.7 MG/DL (ref 8.4–10.2)
CARDIAC TROPONIN I PNL SERPL HS: 4 NG/L (ref ?–50)
CARDIAC TROPONIN I PNL SERPL HS: 4 NG/L (ref ?–50)
CHLORIDE SERPL-SCNC: 106 MMOL/L (ref 96–108)
CO2 SERPL-SCNC: 18 MMOL/L (ref 21–32)
CREAT SERPL-MCNC: 1.08 MG/DL (ref 0.6–1.3)
D DIMER PPP FEU-MCNC: 1.19 UG/ML FEU
EOSINOPHIL # BLD AUTO: 0.2 THOUSAND/ÂΜL (ref 0–0.61)
EOSINOPHIL NFR BLD AUTO: 2 % (ref 0–6)
ERYTHROCYTE [DISTWIDTH] IN BLOOD BY AUTOMATED COUNT: 12.9 % (ref 11.6–15.1)
ERYTHROCYTE [DISTWIDTH] IN BLOOD BY AUTOMATED COUNT: 13 % (ref 11.6–15.1)
FLUAV RNA RESP QL NAA+PROBE: NEGATIVE
FLUBV RNA RESP QL NAA+PROBE: NEGATIVE
GFR SERPL CREATININE-BSD FRML MDRD: 78 ML/MIN/1.73SQ M
GLUCOSE SERPL-MCNC: 104 MG/DL (ref 65–140)
HCT VFR BLD AUTO: 48.1 % (ref 36.5–49.3)
HCT VFR BLD AUTO: 50.9 % (ref 36.5–49.3)
HGB BLD-MCNC: 15.6 G/DL (ref 12–17)
HGB BLD-MCNC: 16.8 G/DL (ref 12–17)
IMM GRANULOCYTES # BLD AUTO: 0.02 THOUSAND/UL (ref 0–0.2)
IMM GRANULOCYTES NFR BLD AUTO: 0 % (ref 0–2)
INR PPP: 0.98 (ref 0.85–1.19)
LACTATE SERPL-SCNC: 0.8 MMOL/L (ref 0.5–2)
LIPASE SERPL-CCNC: 13 U/L (ref 11–82)
LYMPHOCYTES # BLD AUTO: 1.28 THOUSANDS/ÂΜL (ref 0.6–4.47)
LYMPHOCYTES NFR BLD AUTO: 15 % (ref 14–44)
MAGNESIUM SERPL-MCNC: 1.9 MG/DL (ref 1.9–2.7)
MCH RBC QN AUTO: 30.2 PG (ref 26.8–34.3)
MCH RBC QN AUTO: 30.3 PG (ref 26.8–34.3)
MCHC RBC AUTO-ENTMCNC: 32.4 G/DL (ref 31.4–37.4)
MCHC RBC AUTO-ENTMCNC: 33 G/DL (ref 31.4–37.4)
MCV RBC AUTO: 91 FL (ref 82–98)
MCV RBC AUTO: 93 FL (ref 82–98)
MONOCYTES # BLD AUTO: 0.71 THOUSAND/ÂΜL (ref 0.17–1.22)
MONOCYTES NFR BLD AUTO: 8 % (ref 4–12)
NEUTROPHILS # BLD AUTO: 6.51 THOUSANDS/ÂΜL (ref 1.85–7.62)
NEUTS SEG NFR BLD AUTO: 74 % (ref 43–75)
NRBC BLD AUTO-RTO: 0 /100 WBCS
PLATELET # BLD AUTO: 218 THOUSANDS/UL (ref 149–390)
PLATELET # BLD AUTO: 230 THOUSANDS/UL (ref 149–390)
PMV BLD AUTO: 10.2 FL (ref 8.9–12.7)
PMV BLD AUTO: 10.2 FL (ref 8.9–12.7)
POTASSIUM SERPL-SCNC: 3 MMOL/L (ref 3.5–5.3)
PROCALCITONIN SERPL-MCNC: 0.17 NG/ML
PROT SERPL-MCNC: 7.8 G/DL (ref 6.4–8.4)
PROTHROMBIN TIME: 13.5 SECONDS (ref 12.3–15)
RBC # BLD AUTO: 5.15 MILLION/UL (ref 3.88–5.62)
RBC # BLD AUTO: 5.57 MILLION/UL (ref 3.88–5.62)
RSV RNA RESP QL NAA+PROBE: NEGATIVE
SARS-COV-2 RNA RESP QL NAA+PROBE: NEGATIVE
SODIUM SERPL-SCNC: 136 MMOL/L (ref 135–147)
WBC # BLD AUTO: 8.04 THOUSAND/UL (ref 4.31–10.16)
WBC # BLD AUTO: 8.76 THOUSAND/UL (ref 4.31–10.16)

## 2025-02-23 PROCEDURE — 83690 ASSAY OF LIPASE: CPT | Performed by: PHYSICIAN ASSISTANT

## 2025-02-23 PROCEDURE — 74176 CT ABD & PELVIS W/O CONTRAST: CPT

## 2025-02-23 PROCEDURE — 96361 HYDRATE IV INFUSION ADD-ON: CPT

## 2025-02-23 PROCEDURE — 83605 ASSAY OF LACTIC ACID: CPT | Performed by: PHYSICIAN ASSISTANT

## 2025-02-23 PROCEDURE — 71250 CT THORAX DX C-: CPT

## 2025-02-23 PROCEDURE — 36415 COLL VENOUS BLD VENIPUNCTURE: CPT | Performed by: PHYSICIAN ASSISTANT

## 2025-02-23 PROCEDURE — 96365 THER/PROPH/DIAG IV INF INIT: CPT

## 2025-02-23 PROCEDURE — 85730 THROMBOPLASTIN TIME PARTIAL: CPT | Performed by: FAMILY MEDICINE

## 2025-02-23 PROCEDURE — 83880 ASSAY OF NATRIURETIC PEPTIDE: CPT | Performed by: PHYSICIAN ASSISTANT

## 2025-02-23 PROCEDURE — 80053 COMPREHEN METABOLIC PANEL: CPT | Performed by: PHYSICIAN ASSISTANT

## 2025-02-23 PROCEDURE — 87506 IADNA-DNA/RNA PROBE TQ 6-11: CPT

## 2025-02-23 PROCEDURE — 0241U HB NFCT DS VIR RESP RNA 4 TRGT: CPT | Performed by: PHYSICIAN ASSISTANT

## 2025-02-23 PROCEDURE — 84145 PROCALCITONIN (PCT): CPT | Performed by: PHYSICIAN ASSISTANT

## 2025-02-23 PROCEDURE — 93005 ELECTROCARDIOGRAM TRACING: CPT

## 2025-02-23 PROCEDURE — 93970 EXTREMITY STUDY: CPT

## 2025-02-23 PROCEDURE — 85379 FIBRIN DEGRADATION QUANT: CPT | Performed by: PHYSICIAN ASSISTANT

## 2025-02-23 PROCEDURE — 71045 X-RAY EXAM CHEST 1 VIEW: CPT

## 2025-02-23 PROCEDURE — 96366 THER/PROPH/DIAG IV INF ADDON: CPT

## 2025-02-23 PROCEDURE — 96375 TX/PRO/DX INJ NEW DRUG ADDON: CPT

## 2025-02-23 PROCEDURE — 84484 ASSAY OF TROPONIN QUANT: CPT | Performed by: PHYSICIAN ASSISTANT

## 2025-02-23 PROCEDURE — 85610 PROTHROMBIN TIME: CPT | Performed by: FAMILY MEDICINE

## 2025-02-23 PROCEDURE — 99285 EMERGENCY DEPT VISIT HI MDM: CPT

## 2025-02-23 PROCEDURE — 99223 1ST HOSP IP/OBS HIGH 75: CPT | Performed by: FAMILY MEDICINE

## 2025-02-23 PROCEDURE — 85025 COMPLETE CBC W/AUTO DIFF WBC: CPT | Performed by: PHYSICIAN ASSISTANT

## 2025-02-23 PROCEDURE — 70450 CT HEAD/BRAIN W/O DYE: CPT

## 2025-02-23 PROCEDURE — 85027 COMPLETE CBC AUTOMATED: CPT | Performed by: FAMILY MEDICINE

## 2025-02-23 PROCEDURE — 99285 EMERGENCY DEPT VISIT HI MDM: CPT | Performed by: PHYSICIAN ASSISTANT

## 2025-02-23 PROCEDURE — 94760 N-INVAS EAR/PLS OXIMETRY 1: CPT

## 2025-02-23 PROCEDURE — 83735 ASSAY OF MAGNESIUM: CPT | Performed by: PHYSICIAN ASSISTANT

## 2025-02-23 RX ORDER — ALBUTEROL SULFATE 0.83 MG/ML
2.5 SOLUTION RESPIRATORY (INHALATION) EVERY 4 HOURS PRN
Status: DISCONTINUED | OUTPATIENT
Start: 2025-02-23 | End: 2025-02-24 | Stop reason: HOSPADM

## 2025-02-23 RX ORDER — HEPARIN SODIUM 1000 [USP'U]/ML
10000 INJECTION, SOLUTION INTRAVENOUS; SUBCUTANEOUS ONCE
Status: COMPLETED | OUTPATIENT
Start: 2025-02-23 | End: 2025-02-23

## 2025-02-23 RX ORDER — DICYCLOMINE HCL 20 MG
20 TABLET ORAL ONCE
Status: COMPLETED | OUTPATIENT
Start: 2025-02-23 | End: 2025-02-23

## 2025-02-23 RX ORDER — POTASSIUM CHLORIDE 14.9 MG/ML
20 INJECTION INTRAVENOUS
Status: COMPLETED | OUTPATIENT
Start: 2025-02-23 | End: 2025-02-23

## 2025-02-23 RX ORDER — POTASSIUM CHLORIDE 1500 MG/1
40 TABLET, EXTENDED RELEASE ORAL ONCE
Status: COMPLETED | OUTPATIENT
Start: 2025-02-23 | End: 2025-02-23

## 2025-02-23 RX ORDER — HEPARIN SODIUM 10000 [USP'U]/100ML
3-30 INJECTION, SOLUTION INTRAVENOUS
Status: DISCONTINUED | OUTPATIENT
Start: 2025-02-23 | End: 2025-02-24

## 2025-02-23 RX ORDER — ONDANSETRON 2 MG/ML
4 INJECTION INTRAMUSCULAR; INTRAVENOUS ONCE
Status: COMPLETED | OUTPATIENT
Start: 2025-02-23 | End: 2025-02-23

## 2025-02-23 RX ORDER — NICOTINE 21 MG/24HR
21 PATCH, TRANSDERMAL 24 HOURS TRANSDERMAL ONCE
Status: COMPLETED | OUTPATIENT
Start: 2025-02-23 | End: 2025-02-24

## 2025-02-23 RX ORDER — BUDESONIDE 0.5 MG/2ML
0.5 INHALANT ORAL
Status: DISCONTINUED | OUTPATIENT
Start: 2025-02-23 | End: 2025-02-23

## 2025-02-23 RX ADMIN — POTASSIUM CHLORIDE 20 MEQ: 14.9 INJECTION, SOLUTION INTRAVENOUS at 11:49

## 2025-02-23 RX ADMIN — POTASSIUM CHLORIDE 20 MEQ: 14.9 INJECTION, SOLUTION INTRAVENOUS at 15:05

## 2025-02-23 RX ADMIN — DICYCLOMINE HYDROCHLORIDE 20 MG: 20 TABLET ORAL at 10:26

## 2025-02-23 RX ADMIN — POTASSIUM CHLORIDE 40 MEQ: 1500 TABLET, EXTENDED RELEASE ORAL at 17:48

## 2025-02-23 RX ADMIN — HEPARIN SODIUM 18 UNITS/KG/HR: 10000 INJECTION, SOLUTION INTRAVENOUS at 15:59

## 2025-02-23 RX ADMIN — ONDANSETRON 4 MG: 2 INJECTION INTRAMUSCULAR; INTRAVENOUS at 10:26

## 2025-02-23 RX ADMIN — HEPARIN SODIUM 10000 UNITS: 1000 INJECTION, SOLUTION INTRAVENOUS; SUBCUTANEOUS at 15:58

## 2025-02-23 RX ADMIN — NICOTINE 21 MG: 21 PATCH, EXTENDED RELEASE TRANSDERMAL at 14:26

## 2025-02-23 RX ADMIN — SODIUM CHLORIDE 500 ML: 0.9 INJECTION, SOLUTION INTRAVENOUS at 17:48

## 2025-02-23 RX ADMIN — SODIUM CHLORIDE 1000 ML: 0.9 INJECTION, SOLUTION INTRAVENOUS at 10:26

## 2025-02-23 NOTE — ASSESSMENT & PLAN NOTE
Presented with SOB, chest pain and Rt leg swelling.   At baseline he gets SOB while walking on level ground.   Doesn't use home O2.   Chest pain: started >1 week ago, intermittent, worsening with deep inspiration and sitting up.     EKG: sinus tachycardia.   Lab Results   Component Value Date    HSTNI0 4 02/23/2025    HSTNI2 4 02/23/2025    HSTNID2 0 02/23/2025     Plan:  Exclude PE.   CT CHEST: Heavy atherosclerotic coronary artery calcification. Minimal pericardial effusion. New compared to prior.   NO New EKG ischemic changes.   Previous ECHO 60 percent EF. Will order repeat ECHO.   Cardiology consult.

## 2025-02-23 NOTE — LETTER
To Whom it may Concern,     I am writing to confirm that Lisa Davidson Jr. Was hospitalized from on 2/23/25 - 2/24/25 at [facility] for medical reasons. Based on my assessment , I recommend that be excused from work from 2/23/25 - 2/29/25. 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                                                                                            Thank you,                                                                                                    Cecilia Rodriguez MD                                                                                                 Family Medicine Resident PGY-1                                                                                               [facility] track

## 2025-02-23 NOTE — ASSESSMENT & PLAN NOTE
Weight 300 lb, works as .  H/o SKYLER and sleep study years ago. No reports available.     Plan:  Lifestyle modifications.   F/u with PCP / weight loss management upon d/c

## 2025-02-23 NOTE — H&P
H&P - Hospitalist   Name: Lisa Davidson Jr. 54 y.o. male I MRN: [id number]  Unit/Bed#: 409-01 I Date of Admission: 2/23/2025   Date of Service: 2/23/2025 I Hospital Day: 0     Assessment & Plan  Suspected venous thromboembolism (VTE)  Presented with SOB, chest pain and Rt leg swelling.   At baseline he gets SOB while walking on level ground.   Doesn't use home O2.   Chest pain: started >1 week ago, intermittent, worsening with deep inspiration and sitting up.   Rt Leg swelling: started >1 week ago. No redness, swelling noted.     Lab Results   Component Value Date    DDIMER 1.19 (H) 02/23/2025    DDIMER 1.17 (H) 12/07/2020       Risk factors:  with sedentary Lifestyle, Chest pain, POA HR >100.   No H/O cancer or hemoptysis.     Well score for PE: 9 points for Clinical S/S DVT, likley PE, HR>100, Immobilization.   He his high risk for PE.     Plan;  VAS duplex scan B/L legs.   Pt has contrast allergy- angioedema. Hence will start on IV Heparin PE protocol now and do V/Q scan tomorrow.   Cardiac ECHO.   Chest pain on exertion  Presented with SOB, chest pain and Rt leg swelling.   At baseline he gets SOB while walking on level ground.   Doesn't use home O2.   Chest pain: started >1 week ago, intermittent, worsening with deep inspiration and sitting up.     EKG: sinus tachycardia.   Lab Results   Component Value Date    HSTNI0 4 02/23/2025    HSTNI2 4 02/23/2025    HSTNID2 0 02/23/2025     Plan:  Exclude PE.   CT CHEST: Heavy atherosclerotic coronary artery calcification. Minimal pericardial effusion. New compared to prior.   NO New EKG ischemic changes.   Previous ECHO 60 percent EF. Will order repeat ECHO.   Cardiology consult.     Emphysematous COPD (HCC)  Smokes 2-3 packs of cigarettes a day.   At baseline: SOB while walking on level ground.   NO PFTS on file.     CT Chest: Biapical paraseptal emphysema. Mild bilateral linear plate atelectasis. Small round 6 mm nodule persisting in the medial  posterior left lower lobe measuring 6 mm image 4/93. This correlates well with the area of prior cavitary lesion in 2020 and 2021. No further work-up recommended at this time. Likely representing residual scarring.    Plan:  Pulmicort and Atorvent Nebs. Will hold of on Xopenex for now due to Tachy.   Nicotine patch  Pulm rehab Upon D/c.     Acute gastroenteritis  Pt has exposure to sick contacts.   Wife also has similar diarrhea and vomiting since a week.   Pt reports >13 watery diarrhea a day.   No problems with urination.   CT abdomen:   Colonic diverticulosis without findings of acute diverticulitis. There is fluid distention of the stomach and proximal half of the small bowel. Although the distal ileum is completely collapsed, there does not appear to be a discrete obstructing point, rather there is a gradual transition zone seen in the left mid abdomen without associated inflammation or obstructing abnormality.    Plan:  Possible tachy in the setting of dehydration. S/P 1 L Nacl Bolus in the ED.   Will order 500 ml IV Bolus.   C diff testing, stool enteric panel, Giardia antigen ordered. F/u with results.   Encephalomalacia on imaging study  CT head 2017: chronic appearing encephalomalacia in rt frontal lobe and rt cerebellum.   Pt has H/O TBI 34 years ago, per family spend days in the ICU. No reports available on [email or web address] everywhere.     Plan:  Suspicious for PE. Started on IV Heparin.   Will order a baseline CT Head without contrast.   Pt has allergy to iodinated contrast.   Obesity (BMI 30-39.9)  Weight 300 lb, works as .  H/o SKYLER and sleep study years ago. No reports available.     Plan:  Lifestyle modifications.   F/u with PCP / weight loss management upon d/c  SKYLER (obstructive sleep apnea)  Lab Results   Component Value Date    HCT 48.1 02/23/2025    HCT 50.9 (H) 02/23/2025    HCT 46.6 10/12/2024     Elevated HCT in the setting of Obesity, SKYLER and smoking.     Plan:  Bipap PRN for resp  distress.       VTE Pharmacologic Prophylaxis:   High Risk (Score >/= 5) - Pharmacological DVT Prophylaxis Ordered: heparin drip. Sequential Compression Devices Ordered.  Code Status: Level 1 - Full Code   Discussion with family: Updated  (wife) at bedside.    Anticipated Length of Stay: Patient will be admitted on an inpatient basis with an anticipated length of stay of greater than 2 midnights secondary to Suspected VTE due to SOB .    History of Present Illness   Chief Complaint: SOB, chest pain and diarrhea.     Lisa Davidson Jr. is a 54 y.o. male with a PMH of TBI,obesity, SKYLER, pansinusitis,Post cholecystectomy came to ED for SOB, chest pain and diarrhea.   At baseline he gets SOB while walking on level ground. Doesn't use home O2.   Chest pain: started >1 week ago, intermittent, worsening with deep inspiration and sitting up.   Rt Leg swelling: started >1 week ago. No redness, swelling noted.     In the ED, Workup is noteworthy for mild pericardial effusion there is an elevated dimer unfortunately he cannot have either night contrast agent secondary to angioedema true anaphylaxis.   EKG: Sinus tachycardia. CT Chest and abdomen: Colonic diverticulosis, emphysema, Heavy atherosclerotic coronary artery calcification..  Risk factors:  with sedentary Lifestyle, Chest pain, POA HR >100.   No H/O cancer or hemoptysis.   Well score for PE: 9 points for Clinical S/S DVT, likley PE, HR>100, Immobilization.   He his high risk for PE.     Will start him on IVF Bolus, IV Heparin PE protocol and V/Q scan tomorrow.   [facility]   Differential diagnosis includes acute coronary syndrome, pulmonary embolism, pneumonia, sepsis, electrolyte disturbance, acute kidney injury, diverticulitis, bowel obstruction, infectious diarrhea.       Review of Systems   Constitutional:  Positive for fatigue. Negative for fever.   HENT:  Positive for congestion and rhinorrhea.    Eyes: Negative.     Respiratory:  Positive for shortness of breath and wheezing.    Cardiovascular:  Positive for chest pain and leg swelling. Negative for palpitations.   Gastrointestinal:  Positive for diarrhea and vomiting. Negative for nausea.   Endocrine: Negative.    Genitourinary: Negative.    Musculoskeletal: Negative.    Skin: Negative.    Neurological: Negative.    Hematological: Negative.    Psychiatric/Behavioral:  Positive for agitation.        Historical Information   Past Medical History:   Diagnosis Date    Cavitary lesion of lung     Pneumonia      Past Surgical History:   Procedure Laterality Date    CHOLECYSTECTOMY       Social History     Tobacco Use    Smoking status: Every Day     Current packs/day: 1.00     Types: Cigarettes    Smokeless tobacco: Never   Vaping Use    Vaping status: Former   Substance and Sexual Activity    Alcohol use: Never    Drug use: Never    Sexual activity: Not Currently     Partners: Female     E-Cigarette/Vaping    E-Cigarette Use Former User      E-Cigarette/Vaping Substances    Nicotine No     THC No     CBD No     Flavoring No     Other No     Unknown No      Family History   Problem Relation Age of Onset    Cancer Family      Social History:  Marital Status: /Civil Union   Occupation:   Patient Pre-hospital Living Situation: Home  Patient Pre-hospital Level of Mobility: walks  Patient Pre-hospital Diet Restrictions: None     Meds/Allergies   I have reviewed home medications with patient personally.  Prior to Admission medications    Medication Sig Start Date End Date Taking? Authorizing Provider   ascorbic acid (VITAMIN C) 500 MG tablet Take 500 mg by mouth daily    Historical Provider, MD   buPROPion (WELLBUTRIN SR) 150 mg 12 hr tablet Take 1 tablet (150 mg total) by mouth 2 (two) times a day 2/12/21   Agustin Aldana PA-C   calcium carbonate (OS-RENATE) 600 MG tablet Take 600 mg by mouth 2 (two) times a day with meals    Historical Provider, MD   cholecalciferol  (VITAMIN D3) 1,000 units tablet Take 1,000 Units by mouth daily    Historical Provider, MD   cyanocobalamin (VITAMIN B-12) 100 mcg tablet Take by mouth daily    Historical Provider, MD   multivitamin (THERAGRAN) TABS Take 1 tablet by mouth daily    Historical Provider, MD   neomycin-polymyxin-hydrocortisone (CORTISPORIN) otic solution Administer 4 drops to the right ear every 6 (six) hours 12/6/21   WILLIAN Jones   nicotine polacrilex (NICORETTE) 4 mg gum Chew 1 each (4 mg total) as needed for smoking cessation  Patient not taking: Reported on 5/1/2021 2/12/21   Agustin Aldana PA-C   zinc gluconate 50 mg tablet Take 50 mg by mouth daily    Historical Provider, MD     Allergies   Allergen Reactions    Contrast [Iodinated Contrast Media] Angioedema       Objective :  Temp:  [97.6 °F (36.4 °C)-97.8 °F (36.6 °C)] 97.6 °F (36.4 °C)  HR:  [] 82  BP: (120-179)/() 120/76  Resp:  [18-22] 20  SpO2:  [93 %-94 %] 93 %  O2 Device: None (Room air)    Physical Exam  Vitals reviewed.   Constitutional:       Appearance: He is obese.   HENT:      Mouth/Throat:      Pharynx: Oropharynx is clear.   Eyes:      Conjunctiva/sclera: Conjunctivae normal.   Cardiovascular:      Rate and Rhythm: Normal rate.      Pulses: Normal pulses.   Pulmonary:      Breath sounds: Wheezing and rales present.   Abdominal:      General: Bowel sounds are normal.      Palpations: There is no mass.      Tenderness: There is no abdominal tenderness. There is no guarding.      Hernia: No hernia is present.   Musculoskeletal:         General: Normal range of motion.   Skin:     General: Skin is warm.      Capillary Refill: Capillary refill takes less than 2 seconds.   Neurological:      Mental Status: Mental status is at baseline.   Psychiatric:         Mood and Affect: Mood normal.          Lines/Drains:            Lab Results: I have reviewed the following results:  Results from last 7 days   Lab Units 02/23/25  1502 02/23/25  1025   WBC  Thousand/uL 8.04 8.76   HEMOGLOBIN g/dL 15.6 16.8   HEMATOCRIT % 48.1 50.9*   PLATELETS Thousands/uL 218 230   SEGS PCT %  --  74   LYMPHO PCT %  --  15   MONO PCT %  --  8   EOS PCT %  --  2     Results from last 7 days   Lab Units 02/23/25  1025   SODIUM mmol/L 136   POTASSIUM mmol/L 3.0*   CHLORIDE mmol/L 106   CO2 mmol/L 18*   BUN mg/dL 22   CREATININE mg/dL 1.08   ANION GAP mmol/L 12   CALCIUM mg/dL 8.7   ALBUMIN g/dL 4.4   TOTAL BILIRUBIN mg/dL 0.38   ALK PHOS U/L 72   ALT U/L 49   AST U/L 42*   GLUCOSE RANDOM mg/dL 104     Results from last 7 days   Lab Units 02/23/25  1502   INR  0.98         Lab Results   Component Value Date    HGBA1C 5.6 10/12/2024    HGBA1C 5.8 (H) 11/11/2023    HGBA1C 5.3 05/02/2016     Results from last 7 days   Lab Units 02/23/25  1025   LACTIC ACID mmol/L 0.8   PROCALCITONIN ng/ml 0.17       Imaging Results Review: I reviewed radiology reports from this admission including: CT chest.  Other Study Results Review: EKG was reviewed.     Administrative Statements       ** Please Note: This note has been constructed using a voice recognition system. **

## 2025-02-23 NOTE — ASSESSMENT & PLAN NOTE
Lab Results   Component Value Date    HCT 48.1 02/23/2025    HCT 50.9 (H) 02/23/2025    HCT 46.6 10/12/2024     Elevated HCT in the setting of Obesity, SKYLER and smoking.     Plan:  Bipap PRN for resp distress.

## 2025-02-23 NOTE — ASSESSMENT & PLAN NOTE
CT head 2017: chronic appearing encephalomalacia in rt frontal lobe and rt cerebellum.   Pt has H/O TBI 34 years ago, per family spend days in the ICU. No reports available on [email or web address] everywhere.     Plan:  Suspicious for PE. Started on IV Heparin.   Will order a baseline CT Head without contrast.   Pt has allergy to iodinated contrast.

## 2025-02-23 NOTE — ASSESSMENT & PLAN NOTE
Smokes 2-3 packs of cigarettes a day.   At baseline: SOB while walking on level ground.   NO PFTS on file.     CT Chest: Biapical paraseptal emphysema. Mild bilateral linear plate atelectasis. Small round 6 mm nodule persisting in the medial posterior left lower lobe measuring 6 mm image 4/93. This correlates well with the area of prior cavitary lesion in 2020 and 2021. No further work-up recommended at this time. Likely representing residual scarring.    Plan:  Pulmicort and Atorvent Nebs. Will hold of on Xopenex for now due to Tachy.   Nicotine patch  Pulm rehab Upon D/c.

## 2025-02-23 NOTE — ED PROVIDER NOTES
Time reflects when diagnosis was documented in both MDM as applicable and the Disposition within this note       Time User Action Codes Description Comment    2/23/2025  1:33 PM David Samson Add [I31.39] Pericardial effusion     2/23/2025  1:33 PM David Samson Add [R07.9] Chest pain     2/23/2025  2:53 PM David Samson Add [R19.7] Diarrhea           ED Disposition       ED Disposition   Admit    Condition   Stable    Date/Time   Sun Feb 23, 2025  2:53 PM    Comment   Case was discussed with Dr. Morrison and the patient's admission status was agreed to be Admission Status: inpatient status to the service of Dr. Morrison .               Assessment & Plan       Medical Decision Making  54-year-old male here for evaluation of cough chest pain abdominal pain nausea vomiting diarrhea.  See HPI for further details.  Differential diagnosis includes acute coronary syndrome, pulmonary embolism, pneumonia, sepsis, electrolyte disturbance, acute kidney injury, diverticulitis, bowel obstruction, infectious diarrhea.    Workup is noteworthy for mild pericardial effusion there is an elevated dimer unfortunately he cannot have either night contrast agent secondary to angioedema true anaphylaxis.  Discussed case with internal medicine agreeable to inpatient admission we will heparinize him get a VQ scan tomorrow.  Patient in agreement.    Amount and/or Complexity of Data Reviewed  Labs: ordered. Decision-making details documented in ED Course.  Radiology: ordered and independent interpretation performed.    Risk  OTC drugs.  Prescription drug management.  Decision regarding hospitalization.        ED Course as of 02/23/25 1457   Sun Feb 23, 2025   1050 WBC: 8.76   1050 Hemoglobin: 16.8   1050 Platelet Count: 230  CBC reviewed no leukocytosis anemia or thrombocytopenia.   1055 SpO2: 94 %   1055 Respirations: 18   1055 Pulse: 105   1055 Temperature: 97.8 °F (36.6 °C)   1055 Blood Pressure(!): 179/114   1055 Pt ambulated to  bathroom   1120 Unfortunately patient has an elevated D-dimer however has angioedema from CT contrast.   1153 RSV PCR: Negative   1153 INFLU B PCR: Negative   1153 INFLU A PCR: Negative   1153 SARS-COV-2: Negative   1225 Awaiting CT interpretation   1310 MPRESSION:     Fluid distention of the stomach and proximal half of the small bowel. Gradual transition to collapsed distal small bowel in the left mid abdomen. No discrete obstructing point identified.     Minimal pericardial effusion, new compared to prior.     Small residual nodule in the left lower lobe in the area of prior cavitary lesion. No further work-up indicated.     Round well-defined hypodensities in both kidneys, likely cysts.     1310 Delta trop in process   1315 Delta 2hr hsTnI: 0   1325 Patient was updated on the findings, small new  pericardial effusion is noted.  My concern with this patient as he did is an everyday smoker and has an elevated dimer with complaints of shortness of breath and chest pain.  I cannot complete CT angiography secondary to the severe anaphylactic angioedema.  He should be admitted for VQ scan.  Patient is reluctant at this point.  He is having conversation with his wife at this point.   1414 Secure chat message sent to internal medicine       Medications   potassium chloride 20 mEq IVPB (premix) (20 mEq Intravenous New Bag 2/23/25 1149)   nicotine (NICODERM CQ) 21 mg/24 hr TD 24 hr patch 21 mg (21 mg Transdermal Medication Applied 2/23/25 1426)   nicotine polacrilex (NICORETTE) gum 4 mg (has no administration in time range)   potassium chloride (Klor-Con M20) CR tablet 40 mEq (has no administration in time range)   heparin (porcine) injection 10,000 Units (has no administration in time range)   heparin (porcine) 25,000 units in 0.45% NaCl 250 mL infusion (premix) (has no administration in time range)   sodium chloride 0.9 % bolus 1,000 mL (0 mL Intravenous Stopped 2/23/25 1352)   ondansetron (ZOFRAN) injection 4 mg (4 mg  Intravenous Given 2/23/25 1026)   dicyclomine (BENTYL) tablet 20 mg (20 mg Oral Given 2/23/25 1026)       ED Risk Strat Scores   HEART Risk Score      Flowsheet Row Most Recent Value   Heart Score Risk Calculator    History 0 Filed at: 02/23/2025 1006   ECG 0 Filed at: 02/23/2025 1006   Age 1 Filed at: 02/23/2025 1006   Risk Factors 1 Filed at: 02/23/2025 1006   Troponin 0 Filed at: 02/23/2025 1006   HEART Score 2 Filed at: 02/23/2025 1006          HEART Risk Score      Flowsheet Row Most Recent Value   Heart Score Risk Calculator    History 0 Filed at: 02/23/2025 1006   ECG 0 Filed at: 02/23/2025 1006   Age 1 Filed at: 02/23/2025 1006   Risk Factors 1 Filed at: 02/23/2025 1006   Troponin 0 Filed at: 02/23/2025 1006   HEART Score 2 Filed at: 02/23/2025 1006                              SBIRT 22yo+      Flowsheet Row Most Recent Value   Initial Alcohol Screen: US AUDIT-C     1. How often do you have a drink containing alcohol? 0 Filed at: 02/23/2025 1352   2. How many drinks containing alcohol do you have on a typical day you are drinking?  0 Filed at: 02/23/2025 1352   3a. Male UNDER 65: How often do you have five or more drinks on one occasion? 0 Filed at: 02/23/2025 1352   Audit-C Score 0 Filed at: 02/23/2025 1352   NUZHAT: How many times in the past year have you...    Used an illegal drug or used a prescription medication for non-medical reasons? Never Filed at: 02/23/2025 1352                            History of Present Illness       Chief Complaint   Patient presents with    Shortness of Breath     Patient reports increase in worsening SOB.        Past Medical History:   Diagnosis Date    Cavitary lesion of lung     Pneumonia       Past Surgical History:   Procedure Laterality Date    CHOLECYSTECTOMY        Family History   Problem Relation Age of Onset    Cancer Family       Social History     Tobacco Use    Smoking status: Every Day     Current packs/day: 1.00     Types: Cigarettes    Smokeless tobacco:  Never   Vaping Use    Vaping status: Former   Substance Use Topics    Alcohol use: Never    Drug use: Never      E-Cigarette/Vaping    E-Cigarette Use Former User       E-Cigarette/Vaping Substances    Nicotine No     THC No     CBD No     Flavoring No     Other No     Unknown No       I have reviewed and agree with the history as documented.     This is a 54-year-old male presenting to the emergency department today offering multiple complaints.  He states on Wednesday which is now 4 days ago he began with some bilateral anterior chest pressure, 2 days later he started with vomiting diarrhea.  Denies black or red contents of the vomitus or diarrhea.  Diarrhea he states his 50 times a day.  He feels short of breath he is an everyday smoker admits to some abdominal pain as well.  Vital signs reviewed there is mild sinus tachycardia at 104.  Pretension.  No hypoxia.      Shortness of Breath  Associated symptoms: abdominal pain, chest pain, cough, diaphoresis and vomiting    Associated symptoms: no fever, no headaches, no neck pain, no rash, no sore throat and no wheezing        Review of Systems   Constitutional:  Positive for diaphoresis. Negative for activity change, appetite change, chills, fatigue, fever and unexpected weight change.   HENT: Negative.  Negative for sore throat, trouble swallowing and voice change.    Eyes: Negative.    Respiratory:  Positive for cough and shortness of breath. Negative for chest tightness and wheezing.    Cardiovascular:  Positive for chest pain. Negative for palpitations and leg swelling.   Gastrointestinal:  Positive for abdominal pain, diarrhea, nausea and vomiting. Negative for blood in stool.   Endocrine: Negative.    Genitourinary: Negative.  Negative for flank pain and hematuria.   Musculoskeletal: Negative.  Negative for arthralgias, back pain, gait problem, joint swelling, myalgias, neck pain and neck stiffness.   Skin: Negative.  Negative for rash and wound.    Allergic/Immunologic: Negative.    Neurological: Negative.  Negative for dizziness, seizures, syncope, weakness, light-headedness and headaches.   Hematological: Negative.    Psychiatric/Behavioral: Negative.     All other systems reviewed and are negative.          Objective       ED Triage Vitals [02/23/25 1000]   Temperature Pulse Blood Pressure Respirations SpO2 Patient Position - Orthostatic VS   97.8 °F (36.6 °C) 105 (!) 179/114 18 94 % Lying      Temp Source Heart Rate Source BP Location FiO2 (%) Pain Score    Oral Monitor Left arm -- 7      Vitals      Date and Time Temp Pulse SpO2 Resp BP Pain Score FACES Pain Rating User   02/23/25 1330 97.8 °F (36.6 °C) 92 93 % 20 134/85 5 -- [place]   02/23/25 1200 97.6 °F (36.4 °C) 93 94 % 22 126/79 5 -- [place]   02/23/25 1000 97.8 °F (36.6 °C) 105 94 % 18 179/114 7 -- [place]            Physical Exam  Vitals reviewed.   Constitutional:       General: He is not in acute distress.     Appearance: Normal appearance. He is well-developed. He is diaphoretic. He is not ill-appearing or toxic-appearing.      Interventions: He is not intubated.  HENT:      Head: Normocephalic and atraumatic.      Right Ear: External ear normal.      Left Ear: External ear normal.      Mouth/Throat:      Pharynx: No pharyngeal swelling or oropharyngeal exudate.   Eyes:      General: No scleral icterus.        Right eye: No discharge.         Left eye: No discharge.      Extraocular Movements: Extraocular movements intact.      Conjunctiva/sclera: Conjunctivae normal.   Cardiovascular:      Rate and Rhythm: Tachycardia present.      Pulses: Normal pulses.   Pulmonary:      Effort: Pulmonary effort is normal. No tachypnea, bradypnea, accessory muscle usage or respiratory distress. He is not intubated.      Breath sounds: No stridor. No decreased breath sounds, wheezing, rhonchi or rales.   Musculoskeletal:         General: No deformity or signs of injury.      Cervical back: Normal range of motion. No  rigidity.      Right lower leg: No tenderness. No edema.      Left lower leg: No tenderness. No edema.   Skin:     General: Skin is warm.      Coloration: Skin is not jaundiced.      Findings: No lesion or rash.   Neurological:      General: No focal deficit present.      Mental Status: He is alert and oriented to person, place, and time. Mental status is at baseline.      Gait: Gait normal.   Psychiatric:         Mood and Affect: Mood normal.         Thought Content: Thought content normal.         Judgment: Judgment normal.         Results Reviewed       Procedure Component Value Units Date/Time    Urinalysis with microscopic [[id number]]     Lab Status: No result Specimen: Urine     CBC [[id number]]     Lab Status: No result Specimen: Blood     APTT [[id number]]     Lab Status: No result Specimen: Blood     Protime-INR [[id number]]     Lab Status: No result Specimen: Blood     HS Troponin I 2hr [[id number]]  (Normal) Collected: 02/23/25 1234    Lab Status: Final result Specimen: Blood from Arm, Right Updated: 02/23/25 1314     hs TnI 2hr 4 ng/L      Delta 2hr hsTnI 0 ng/L     FLU/RSV/COVID - if FLU/RSV clinically relevant (2hr TAT) [[id number]]  (Normal) Collected: 02/23/25 1025    Lab Status: Final result Specimen: Nares from Nose Updated: 02/23/25 1127     SARS-CoV-2 Negative     INFLUENZA A PCR Negative     INFLUENZA B PCR Negative     RSV PCR Negative    Narrative:      This test has been performed using the CoV-2/Flu/RSV plus assay on the [employer]pert platform. This test has been validated by the  and verified by the performing laboratory.     This test is designed to amplify and detect the following: nucleocapsid (N), envelope (E), and RNA-dependent RNA polymerase (RdRP) genes of the SARS-CoV-2 genome; matrix (M), basic polymerase (PB2), and acidic protein (PA) segments of the influenza A genome; matrix (M) and non-structural protein (NS) segments of the influenza B genome, and the nucleocapsid  genes of RSV A and RSV B.     Positive results are indicative of the presence of Flu A, Flu B, RSV, and/or SARS-CoV-2 RNA. Positive results for SARS-CoV-2 or suspected novel influenza should be reported to state, local, or federal health departments according to local reporting requirements.      All results should be assessed in conjunction with clinical presentation and other laboratory markers for clinical management.     FOR PEDIATRIC PATIENTS - copy/paste COVID Guidelines URL to browser: [email or web address]       D-Dimer [[id number]]  (Abnormal) Collected: 02/23/25 1025    Lab Status: Final result Specimen: Blood from Arm, Right Updated: 02/23/25 1115     D-Dimer, Quant 1.19 ug/ml FEU     Narrative:      In the evaluation for possible pulmonary embolism, in the appropriate (Well's Score of 4 or less) patient, the age adjusted d-dimer cutoff for this patient can be calculated as:    Age x 0.01 (in ug/mL) for Age-adjusted D-dimer exclusion threshold for a patient over 50 years.    Procalcitonin [[id number]]  (Normal) Collected: 02/23/25 1025    Lab Status: Final result Specimen: Blood from Arm, Right Updated: 02/23/25 1109     Procalcitonin 0.17 ng/ml     HS Troponin 0hr (reflex protocol) [[id number]]  (Normal) Collected: 02/23/25 1025    Lab Status: Final result Specimen: Blood from Arm, Right Updated: 02/23/25 1104     hs TnI 0hr 4 ng/L     B-Type Natriuretic Peptide(BNP) [[id number]]  (Normal) Collected: 02/23/25 1025    Lab Status: Final result Specimen: Blood from Arm, Right Updated: 02/23/25 1104     BNP 13 pg/mL     Lactic acid, plasma (w/reflex if result > 2.0) [[id number]]  (Normal) Collected: 02/23/25 1025    Lab Status: Final result Specimen: Blood from Arm, Right Updated: 02/23/25 1059     LACTIC ACID 0.8 mmol/L     Narrative:      Result may be elevated if tourniquet was used during collection.    Comprehensive metabolic panel [[id number]]  (Abnormal)  Collected: 02/23/25 1025    Lab Status: Final result Specimen: Blood from Arm, Right Updated: 02/23/25 1059     Sodium 136 mmol/L      Potassium 3.0 mmol/L      Chloride 106 mmol/L      CO2 18 mmol/L      ANION GAP 12 mmol/L      BUN 22 mg/dL      Creatinine 1.08 mg/dL      Glucose 104 mg/dL      Calcium 8.7 mg/dL      AST 42 U/L      ALT 49 U/L      Alkaline Phosphatase 72 U/L      Total Protein 7.8 g/dL      Albumin 4.4 g/dL      Total Bilirubin 0.38 mg/dL      eGFR 78 ml/min/1.73sq m     Narrative:      National Kidney Disease Foundation guidelines for Chronic Kidney Disease (CKD):     Stage 1 with normal or high GFR (GFR > 90 mL/min/1.73 square meters)    Stage 2 Mild CKD (GFR = 60-89 mL/min/1.73 square meters)    Stage 3A Moderate CKD (GFR = 45-59 mL/min/1.73 square meters)    Stage 3B Moderate CKD (GFR = 30-44 mL/min/1.73 square meters)    Stage 4 Severe CKD (GFR = 15-29 mL/min/1.73 square meters)    Stage 5 End Stage CKD (GFR <15 mL/min/1.73 square meters)  Note: GFR calculation is accurate only with a steady state creatinine    Magnesium [[id number]]  (Normal) Collected: 02/23/25 1025    Lab Status: Final result Specimen: Blood from Arm, Right Updated: 02/23/25 1059     Magnesium 1.9 mg/dL     Lipase [[id number]]  (Normal) Collected: 02/23/25 1025    Lab Status: Final result Specimen: Blood from Arm, Right Updated: 02/23/25 1059     Lipase 13 u/L     CBC and differential [[id number]]  (Abnormal) Collected: 02/23/25 1025    Lab Status: Final result Specimen: Blood from Arm, Right Updated: 02/23/25 1042     WBC 8.76 Thousand/uL      RBC 5.57 Million/uL      Hemoglobin 16.8 g/dL      Hematocrit 50.9 %      MCV 91 fL      MCH 30.2 pg      MCHC 33.0 g/dL      RDW 12.9 %      MPV 10.2 fL      Platelets 230 Thousands/uL      nRBC 0 /100 WBCs      Segmented % 74 %      Immature Grans % 0 %      Lymphocytes % 15 %      Monocytes % 8 %      Eosinophils Relative 2 %      Basophils Relative 1 %      Absolute Neutrophils  6.51 Thousands/µL      Absolute Immature Grans 0.02 Thousand/uL      Absolute Lymphocytes 1.28 Thousands/µL      Absolute Monocytes 0.71 Thousand/µL      Eosinophils Absolute 0.20 Thousand/µL      Basophils Absolute 0.04 Thousands/µL             CT chest abdomen pelvis wo contrast   Final Interpretation by Jillian Gallardo MD (02/23 1304)      Fluid distention of the stomach and proximal half of the small bowel. Gradual transition to collapsed distal small bowel in the left mid abdomen. No discrete obstructing point identified.      Minimal pericardial effusion, new compared to prior.      Small residual nodule in the left lower lobe in the area of prior cavitary lesion. No further work-up indicated.      Round well-defined hypodensities in both kidneys, likely cysts.               Workstation performed: [id number]         XR chest 1 view portable   ED Interpretation by David Samson PA-C (02/23 1055)   No gross consolidative process no pneumothorax no effusion.      Final Interpretation by Hank Irwin MD (02/23 1400)      No acute cardiopulmonary disease.            Workstation performed: [id number]          VAS VENOUS DUPLEX - LOWER LIMB BILATERAL    (Results Pending)   NM inpatient order    (Results Pending)       ECG 12 Lead Documentation Only    Date/Time: 2/23/2025 10:06 AM    Performed by: David Samson PA-C  Authorized by: David Samson PA-C    Indications / Diagnosis:  Chest pain  ECG reviewed by me, the ED Provider: yes    Patient location:  ED  Previous ECG:     Previous ECG:  Compared to current    Similarity:  No change    Comparison to cardiac monitor: Yes    Interpretation:     Interpretation: normal    Rate:     ECG rate:  104    ECG rate assessment: tachycardic    Rhythm:     Rhythm: sinus tachycardia    Ectopy:     Ectopy: none    QRS:     QRS axis:  Normal    QRS intervals:  Normal  Conduction:     Conduction: normal    ST segments:     ST  segments:  Normal  T waves:     T waves: normal        ED Medication and Procedure Management   Prior to Admission Medications   Prescriptions Last Dose Informant Patient Reported? Taking?   ascorbic acid (VITAMIN C) 500 MG tablet   Yes No   Sig: Take 500 mg by mouth daily   buPROPion (WELLBUTRIN SR) 150 mg 12 hr tablet   No No   Sig: Take 1 tablet (150 mg total) by mouth 2 (two) times a day   calcium carbonate (OS-RENATE) 600 MG tablet   Yes No   Sig: Take 600 mg by mouth 2 (two) times a day with meals   cholecalciferol (VITAMIN D3) 1,000 units tablet   Yes No   Sig: Take 1,000 Units by mouth daily   cyanocobalamin (VITAMIN B-12) 100 mcg tablet   Yes No   Sig: Take by mouth daily   multivitamin (THERAGRAN) TABS   Yes No   Sig: Take 1 tablet by mouth daily   neomycin-polymyxin-hydrocortisone (CORTISPORIN) otic solution   No No   Sig: Administer 4 drops to the right ear every 6 (six) hours   nicotine polacrilex (NICORETTE) 4 mg gum   No No   Sig: Chew 1 each (4 mg total) as needed for smoking cessation   Patient not taking: Reported on 5/1/2021   zinc gluconate 50 mg tablet   Yes No   Sig: Take 50 mg by mouth daily      Facility-Administered Medications: None     Patient's Medications   Discharge Prescriptions    No medications on file     No discharge procedures on file.  ED SEPSIS DOCUMENTATION   Time reflects when diagnosis was documented in both MDM as applicable and the Disposition within this note       Time User Action Codes Description Comment    2/23/2025  1:33 PM David Samson [I31.39] Pericardial effusion     2/23/2025  1:33 PM David Samson Add [R07.9] Chest pain     2/23/2025  2:53 PM David Samson [R19.7] Diarrhea                  David Samson PA-C  02/23/25 9303

## 2025-02-23 NOTE — ASSESSMENT & PLAN NOTE
Presented with SOB, chest pain and Rt leg swelling.   At baseline he gets SOB while walking on level ground.   Doesn't use home O2.   Chest pain: started >1 week ago, intermittent, worsening with deep inspiration and sitting up.   Rt Leg swelling: started >1 week ago. No redness, swelling noted.     Lab Results   Component Value Date    DDIMER 1.19 (H) 02/23/2025    DDIMER 1.17 (H) 12/07/2020       Risk factors:  with sedentary Lifestyle, Chest pain, POA HR >100.   No H/O cancer or hemoptysis.     Well score for PE: 9 points for Clinical S/S DVT, likley PE, HR>100, Immobilization.   He his high risk for PE.     Plan;  VAS duplex scan B/L legs.   Pt has contrast allergy- angioedema. Hence will start on IV Heparin PE protocol now and do V/Q scan tomorrow.   Cardiac ECHO.

## 2025-02-23 NOTE — PLAN OF CARE
Problem: PAIN - ADULT  Goal: Verbalizes/displays adequate comfort level or baseline comfort level  Description: Interventions:  - Encourage patient to monitor pain and request assistance  - Assess pain using appropriate pain scale  - Administer analgesics based on type and severity of pain and evaluate response  - Implement non-pharmacological measures as appropriate and evaluate response  - Consider cultural and social influences on pain and pain management  - Notify physician/advanced practitioner if interventions unsuccessful or patient reports new pain  Outcome: Progressing     Problem: INFECTION - ADULT  Goal: Absence or prevention of progression during hospitalization  Description: INTERVENTIONS:  - Assess and monitor for signs and symptoms of infection  - Monitor lab/diagnostic results  - Monitor all insertion sites, i.e. indwelling lines, tubes, and drains  - Monitor endotracheal if appropriate and nasal secretions for changes in amount and color  - [place] appropriate cooling/warming therapies per order  - Administer medications as ordered  - Instruct and encourage patient and family to use good hand hygiene technique  - Identify and instruct in appropriate isolation precautions for identified infection/condition  Outcome: Progressing     Problem: SAFETY ADULT  Goal: Patient will remain free of falls  Description: INTERVENTIONS:  - Educate patient/family on patient safety including physical limitations  - Instruct patient to call for assistance with activity   - Consult OT/PT to assist with strengthening/mobility   - Keep Call bell within reach  - Keep bed low and locked with side rails adjusted as appropriate  - Keep care items and personal belongings within reach  - Initiate and maintain comfort rounds  - Make Fall Risk Sign visible to staff  - Offer Toileting every 2 Hours, in advance of need  - Initiate/Maintain bed/ chair alarm  - Obtain necessary fall risk management equipment:   - Apply yellow  socks and bracelet for high fall risk patients  - Consider moving patient to room near nurses station  Outcome: Progressing     Problem: DISCHARGE PLANNING  Goal: Discharge to home or other facility with appropriate resources  Description: INTERVENTIONS:  - Identify barriers to discharge w/patient and caregiver  - Arrange for needed discharge resources and transportation as appropriate  - Identify discharge learning needs (meds, wound care, etc.)  - Arrange for interpretive services to assist at discharge as needed  - Refer to Case Management Department for coordinating discharge planning if the patient needs post-hospital services based on physician/advanced practitioner order or complex needs related to functional status, cognitive ability, or social support system  Outcome: Progressing     Problem: Knowledge Deficit  Goal: Patient/family/caregiver demonstrates understanding of disease process, treatment plan, medications, and discharge instructions  Description: Complete learning assessment and assess knowledge base.  Interventions:  - Provide teaching at level of understanding  - Provide teaching via preferred learning methods  Outcome: Progressing

## 2025-02-23 NOTE — RESPIRATORY THERAPY NOTE
RT Protocol Note  Lisa Davidson Jr. 54 y.o. male MRN: [id number]  Unit/Bed#: 409-01 Encounter: [id number]    Assessment    Principal Problem:    Suspected venous thromboembolism (VTE)  Active Problems:    Obesity (BMI 30-39.9)    SKYLER (obstructive sleep apnea)    Encephalomalacia on imaging study    Acute gastroenteritis    Emphysematous COPD (HCC)    Chest pain on exertion      Home Pulmonary Medications:  No respiratory medications, no oxygen therapy, not using pap therapy at present       Past Medical History:   Diagnosis Date    Cavitary lesion of lung     Pneumonia      Social History     Socioeconomic History    Marital status: /Civil Union     Spouse name: Not on file    Number of children: Not on file    Years of education: Not on file    Highest education level: Not on file   Occupational History    Not on file   Tobacco Use    Smoking status: Every Day     Current packs/day: 1.00     Types: Cigarettes    Smokeless tobacco: Never   Vaping Use    Vaping status: Former   Substance and Sexual Activity    Alcohol use: Never    Drug use: Never    Sexual activity: Not Currently     Partners: Female   Other Topics Concern    Not on file   Social History Narrative    Not on file     Social Drivers of Health     Financial Resource Strain: Not on file   Food Insecurity: No Food Insecurity (2/23/2025)    Nursing - Inadequate Food Risk Classification     Worried About Running Out of Food in the Last Year: Not on file     Ran Out of Food in the Last Year: Not on file     Ran Out of Food in the Last Year: Never true   Transportation Needs: No Transportation Needs (2/23/2025)    Nursing - Transportation Risk Classification     Lack of Transportation: Not on file     Lack of Transportation: No   Physical Activity: Not on file   Stress: Not on file   Social Connections: Not on file   Intimate Partner Violence: Unknown (2/23/2025)    Nursing IPS     Feels Physically and Emotionally Safe: Not on file     Physically Hurt  "by Someone: Not on file     Humiliated or Emotionally Abused by Someone: Not on file     Physically Hurt by Someone: No     Hurt or Threatened by Someone: No   Housing Stability: Unknown (2025)    Nursing: Inadequate Housing Risk Classification     Has Housing: Not on file     Worried About Losing Housing: Not on file     Unable to Get Utilities: Not on file     Unable to Pay for Housing in the Last Year: No     Has Housin       Subjective         Objective    Physical Exam:   Assessment Type: During-treatment  General Appearance: Alert, Awake  Respiratory Pattern: Normal  Chest Assessment: Chest expansion symmetrical, Trachea midline  Bilateral Breath Sounds: Diminished, Coarse  Cough: Non-productive  O2 Device: room air    Vitals:  Blood pressure 120/80, pulse 76, temperature 97.7 °F (36.5 °C), temperature source Oral, resp. rate 18, height 6' 2.5\" (1.892 m), weight (!) 149 kg (328 lb 11.3 oz), SpO2 94%.          Imaging and other studies:     O2 Device: room air     Plan    Respiratory Plan: No distress/Pulmonary history  Airway Clearance Plan: Incentive Spirometer     Resp Comments: Patient received in his room, he is on room air. At this time patient denies sob. Patient uses no respiratory medications, will dc the respiratory medications due to no known pulmonary history, no medication useage and patient is not currently wheezing. Will make prn Albuteral tx for sob/wheezing. Patient does not use any home oxygen. Patient does have a history of sleep apnea, at the present he does not use any home pap units. Patient unwilling to use a machine tonight, wants to see what his oxygen level does tonight while he sleeps. I did try and give patient some education on sleep apnea but patient not understanding at this time, he has family members in the room that also tried to help patient understand. I have instructed and educated patient on the use of the incentive spirometer to help promote better bronchial " hygiene to be used Q1H, patient gave a good return demonstration back. Will ask for a bipap/cpap order for if the patient does need it or changes his mind

## 2025-02-23 NOTE — ASSESSMENT & PLAN NOTE
Pt has exposure to sick contacts.   Wife also has similar diarrhea and vomiting since a week.   Pt reports >13 watery diarrhea a day.   No problems with urination.   CT abdomen:   Colonic diverticulosis without findings of acute diverticulitis. There is fluid distention of the stomach and proximal half of the small bowel. Although the distal ileum is completely collapsed, there does not appear to be a discrete obstructing point, rather there is a gradual transition zone seen in the left mid abdomen without associated inflammation or obstructing abnormality.    Plan:  Possible tachy in the setting of dehydration. S/P 1 L Nacl Bolus in the ED.   Will order 500 ml IV Bolus.   C diff testing, stool enteric panel, Giardia antigen ordered. F/u with results.

## 2025-02-23 NOTE — LETTER
To   Whom it may concern,     Lisa Davidson Jr. Was hospitalized from 2/23/25 - 2/24/25 at [facility] under my care. He can return to work on Sunday March 2 nd, 2025.   No weight restrictions are required.     Please call [facility]/ my office for further questions.                                                                                                                Thank you,                                                                                                          Cecilia Lao. Dagmar ANDERSON.

## 2025-02-24 ENCOUNTER — APPOINTMENT (INPATIENT)
Dept: NUCLEAR MEDICINE | Facility: HOSPITAL | Age: 55
DRG: 392 | End: 2025-02-24
Payer: COMMERCIAL

## 2025-02-24 ENCOUNTER — APPOINTMENT (INPATIENT)
Dept: NON INVASIVE DIAGNOSTICS | Facility: HOSPITAL | Age: 55
DRG: 392 | End: 2025-02-24
Payer: COMMERCIAL

## 2025-02-24 ENCOUNTER — TRANSITIONAL CARE MANAGEMENT (OUTPATIENT)
Dept: FAMILY MEDICINE CLINIC | Facility: CLINIC | Age: 55
End: 2025-02-24

## 2025-02-24 VITALS
WEIGHT: 315 LBS | BODY MASS INDEX: 40.43 KG/M2 | DIASTOLIC BLOOD PRESSURE: 67 MMHG | TEMPERATURE: 98.3 F | HEART RATE: 69 BPM | RESPIRATION RATE: 14 BRPM | SYSTOLIC BLOOD PRESSURE: 107 MMHG | OXYGEN SATURATION: 94 % | HEIGHT: 74 IN

## 2025-02-24 LAB
ALBUMIN SERPL BCG-MCNC: 3.7 G/DL (ref 3.5–5)
ALP SERPL-CCNC: 63 U/L (ref 34–104)
ALT SERPL W P-5'-P-CCNC: 54 U/L (ref 7–52)
ANION GAP SERPL CALCULATED.3IONS-SCNC: 9 MMOL/L (ref 4–13)
AORTIC ROOT: 3.9 CM
APTT PPP: 58 SECONDS (ref 23–34)
ASCENDING AORTA: 4.2 CM
AST SERPL W P-5'-P-CCNC: 32 U/L (ref 13–39)
ATRIAL RATE: 104 BPM
BILIRUB SERPL-MCNC: 0.29 MG/DL (ref 0.2–1)
BSA FOR ECHO PROCEDURE: 2.68 M2
BUN SERPL-MCNC: 19 MG/DL (ref 5–25)
C COLI+JEJUNI TUF STL QL NAA+PROBE: NEGATIVE
CALCIUM SERPL-MCNC: 8.3 MG/DL (ref 8.4–10.2)
CHLORIDE SERPL-SCNC: 108 MMOL/L (ref 96–108)
CHOLEST SERPL-MCNC: 77 MG/DL (ref ?–200)
CO2 SERPL-SCNC: 19 MMOL/L (ref 21–32)
CREAT SERPL-MCNC: 0.96 MG/DL (ref 0.6–1.3)
E WAVE DECELERATION TIME: 223 MS
E/A RATIO: 0.75
EC STX1+STX2 GENES STL QL NAA+PROBE: NEGATIVE
ERYTHROCYTE [DISTWIDTH] IN BLOOD BY AUTOMATED COUNT: 13 % (ref 11.6–15.1)
EST. AVERAGE GLUCOSE BLD GHB EST-MCNC: 117 MG/DL
FRACTIONAL SHORTENING: 35 (ref 28–44)
GFR SERPL CREATININE-BSD FRML MDRD: 90 ML/MIN/1.73SQ M
GLUCOSE SERPL-MCNC: 99 MG/DL (ref 65–140)
HBA1C MFR BLD: 5.7 %
HCT VFR BLD AUTO: 44.4 % (ref 36.5–49.3)
HDLC SERPL-MCNC: 22 MG/DL
HGB BLD-MCNC: 14.8 G/DL (ref 12–17)
INTERVENTRICULAR SEPTUM IN DIASTOLE (PARASTERNAL SHORT AXIS VIEW): 1.4 CM
INTERVENTRICULAR SEPTUM: 1.4 CM (ref 0.6–1.1)
LAAS-AP2: 13.6 CM2
LAAS-AP4: 21.7 CM2
LDLC SERPL CALC-MCNC: 35 MG/DL (ref 0–100)
LEFT ATRIUM SIZE: 3.6 CM
LEFT ATRIUM VOLUME (MOD BIPLANE): 55 ML
LEFT ATRIUM VOLUME INDEX (MOD BIPLANE): 20.4 ML/M2
LEFT INTERNAL DIMENSION IN SYSTOLE: 3.5 CM (ref 2.1–4)
LEFT VENTRICULAR INTERNAL DIMENSION IN DIASTOLE: 5.4 CM (ref 3.5–6)
LEFT VENTRICULAR POSTERIOR WALL IN END DIASTOLE: 1.4 CM
LEFT VENTRICULAR STROKE VOLUME: 92 ML
LV EF US.2D.A4C+ESTIMATED: 63 %
LVSV (TEICH): 92 ML
MAGNESIUM SERPL-MCNC: 2 MG/DL (ref 1.9–2.7)
MCH RBC QN AUTO: 30.6 PG (ref 26.8–34.3)
MCHC RBC AUTO-ENTMCNC: 33.3 G/DL (ref 31.4–37.4)
MCV RBC AUTO: 92 FL (ref 82–98)
MV E'TISSUE VEL-LAT: 13 CM/S
MV E'TISSUE VEL-SEP: 9 CM/S
MV PEAK A VEL: 0.63 M/S
MV PEAK E VEL: 47 CM/S
MV STENOSIS PRESSURE HALF TIME: 65 MS
MV VALVE AREA P 1/2 METHOD: 3.38
P AXIS: 33 DEGREES
PLATELET # BLD AUTO: 191 THOUSANDS/UL (ref 149–390)
PMV BLD AUTO: 10.1 FL (ref 8.9–12.7)
POTASSIUM SERPL-SCNC: 3.3 MMOL/L (ref 3.5–5.3)
PR INTERVAL: 126 MS
PROT SERPL-MCNC: 6.6 G/DL (ref 6.4–8.4)
QRS AXIS: 54 DEGREES
QRSD INTERVAL: 98 MS
QT INTERVAL: 342 MS
QTC INTERVAL: 449 MS
RA PRESSURE ESTIMATED: 5 MMHG
RBC # BLD AUTO: 4.83 MILLION/UL (ref 3.88–5.62)
RIGHT ATRIUM AREA SYSTOLE A4C: 20.1 CM2
RIGHT VENTRICLE ID DIMENSION: 4.3 CM
SALMONELLA SP SPAO STL QL NAA+PROBE: NEGATIVE
SHIGELLA SP+EIEC IPAH STL QL NAA+PROBE: NEGATIVE
SINOTUBULAR JUNCTION: 3.1 CM
SL CV LEFT ATRIUM LENGTH A2C: 4.5 CM
SL CV LV EF: 55
SL CV PED ECHO LEFT VENTRICLE DIASTOLIC VOLUME (MOD BIPLANE) 2D: 144 ML
SL CV PED ECHO LEFT VENTRICLE SYSTOLIC VOLUME (MOD BIPLANE) 2D: 51 ML
SL CV SINUS OF VALSALVA 2D: 3.8 CM
SODIUM SERPL-SCNC: 136 MMOL/L (ref 135–147)
STJ: 3.1 CM
T WAVE AXIS: 104 DEGREES
TRICUSPID ANNULAR PLANE SYSTOLIC EXCURSION: 2 CM
TRIGL SERPL-MCNC: 102 MG/DL (ref ?–150)
VENTRICULAR RATE: 104 BPM
WBC # BLD AUTO: 6.89 THOUSAND/UL (ref 4.31–10.16)

## 2025-02-24 PROCEDURE — 93306 TTE W/DOPPLER COMPLETE: CPT

## 2025-02-24 PROCEDURE — 83036 HEMOGLOBIN GLYCOSYLATED A1C: CPT | Performed by: FAMILY MEDICINE

## 2025-02-24 PROCEDURE — 85730 THROMBOPLASTIN TIME PARTIAL: CPT | Performed by: FAMILY MEDICINE

## 2025-02-24 PROCEDURE — 93010 ELECTROCARDIOGRAM REPORT: CPT | Performed by: INTERNAL MEDICINE

## 2025-02-24 PROCEDURE — 85027 COMPLETE CBC AUTOMATED: CPT

## 2025-02-24 PROCEDURE — 78580 LUNG PERFUSION IMAGING: CPT

## 2025-02-24 PROCEDURE — 93970 EXTREMITY STUDY: CPT | Performed by: STUDENT IN AN ORGANIZED HEALTH CARE EDUCATION/TRAINING PROGRAM

## 2025-02-24 PROCEDURE — 93306 TTE W/DOPPLER COMPLETE: CPT | Performed by: INTERNAL MEDICINE

## 2025-02-24 PROCEDURE — 99239 HOSP IP/OBS DSCHRG MGMT >30: CPT | Performed by: FAMILY MEDICINE

## 2025-02-24 PROCEDURE — 83735 ASSAY OF MAGNESIUM: CPT

## 2025-02-24 PROCEDURE — A9540 TC99M MAA: HCPCS

## 2025-02-24 PROCEDURE — 80053 COMPREHEN METABOLIC PANEL: CPT

## 2025-02-24 PROCEDURE — 80061 LIPID PANEL: CPT | Performed by: FAMILY MEDICINE

## 2025-02-24 RX ORDER — ALBUTEROL SULFATE 90 UG/1
2 INHALANT RESPIRATORY (INHALATION) EVERY 6 HOURS PRN
Qty: 96 G | Refills: 0 | Status: SHIPPED | OUTPATIENT
Start: 2025-02-24

## 2025-02-24 RX ORDER — POTASSIUM CHLORIDE 1500 MG/1
20 TABLET, EXTENDED RELEASE ORAL ONCE
Status: COMPLETED | OUTPATIENT
Start: 2025-02-24 | End: 2025-02-24

## 2025-02-24 RX ORDER — VARENICLINE TARTRATE 0.5 MG/1
0.5 TABLET, FILM COATED ORAL 2 TIMES DAILY
Qty: 180 TABLET | Refills: 1 | Status: SHIPPED | OUTPATIENT
Start: 2025-02-24

## 2025-02-24 RX ORDER — SODIUM CHLORIDE, SODIUM GLUCONATE, SODIUM ACETATE, POTASSIUM CHLORIDE, MAGNESIUM CHLORIDE, SODIUM PHOSPHATE, DIBASIC, AND POTASSIUM PHOSPHATE .53; .5; .37; .037; .03; .012; .00082 G/100ML; G/100ML; G/100ML; G/100ML; G/100ML; G/100ML; G/100ML
1000 INJECTION, SOLUTION INTRAVENOUS ONCE
Status: COMPLETED | OUTPATIENT
Start: 2025-02-24 | End: 2025-02-24

## 2025-02-24 RX ADMIN — POTASSIUM CHLORIDE 20 MEQ: 1500 TABLET, EXTENDED RELEASE ORAL at 08:22

## 2025-02-24 RX ADMIN — HEPARIN SODIUM 15 UNITS/KG/HR: 10000 INJECTION, SOLUTION INTRAVENOUS at 05:30

## 2025-02-24 RX ADMIN — SODIUM CHLORIDE, SODIUM GLUCONATE, SODIUM ACETATE, POTASSIUM CHLORIDE, MAGNESIUM CHLORIDE, SODIUM PHOSPHATE, DIBASIC, AND POTASSIUM PHOSPHATE 1000 ML: .53; .5; .37; .037; .03; .012; .00082 INJECTION, SOLUTION INTRAVENOUS at 13:29

## 2025-02-24 NOTE — ASSESSMENT & PLAN NOTE
Pt has exposure to sick contacts.   Wife also has similar diarrhea and vomiting since a week.   Pt reports >13 watery diarrhea a day.   No problems with urination.   CT abdomen:   Colonic diverticulosis without findings of acute diverticulitis. There is fluid distention of the stomach and proximal half of the small bowel. Although the distal ileum is completely collapsed, there does not appear to be a discrete obstructing point, rather there is a gradual transition zone seen in the left mid abdomen without associated inflammation or obstructing abnormality.    Plan:  Possible tachy in the setting of dehydration. S/P 1 L Nacl Bolus in the ED.   S/p 500 ml IV Bolus.   C diff testing, stool enteric panel, Giardia antigen ordered. F/u with results. - pending

## 2025-02-24 NOTE — PLAN OF CARE
Problem: PAIN - ADULT  Goal: Verbalizes/displays adequate comfort level or baseline comfort level  Description: Interventions:  - Encourage patient to monitor pain and request assistance  - Assess pain using appropriate pain scale  - Administer analgesics based on type and severity of pain and evaluate response  - Implement non-pharmacological measures as appropriate and evaluate response  - Consider cultural and social influences on pain and pain management  - Notify physician/advanced practitioner if interventions unsuccessful or patient reports new pain  Outcome: Progressing     Problem: INFECTION - ADULT  Goal: Absence or prevention of progression during hospitalization  Description: INTERVENTIONS:  - Assess and monitor for signs and symptoms of infection  - Monitor lab/diagnostic results  - Monitor all insertion sites, i.e. indwelling lines, tubes, and drains  - Monitor endotracheal if appropriate and nasal secretions for changes in amount and color  - Lesage appropriate cooling/warming therapies per order  - Administer medications as ordered  - Instruct and encourage patient and family to use good hand hygiene technique  - Identify and instruct in appropriate isolation precautions for identified infection/condition  Outcome: Progressing     Problem: SAFETY ADULT  Goal: Patient will remain free of falls  Description: INTERVENTIONS:  - Educate patient/family on patient safety including physical limitations  - Instruct patient to call for assistance with activity   - Consult OT/PT to assist with strengthening/mobility   - Keep Call bell within reach  - Keep bed low and locked with side rails adjusted as appropriate  - Keep care items and personal belongings within reach  - Initiate and maintain comfort rounds  - Make Fall Risk Sign visible to staff  - Offer Toileting every 2 Hours, in advance of need  - Initiate/Maintain bed/chair alarm  - Obtain necessary fall risk management equipment: non skid socks  -  Apply yellow socks and bracelet for high fall risk patients  - Consider moving patient to room near nurses station  Outcome: Progressing     Problem: DISCHARGE PLANNING  Goal: Discharge to home or other facility with appropriate resources  Description: INTERVENTIONS:  - Identify barriers to discharge w/patient and caregiver  - Arrange for needed discharge resources and transportation as appropriate  - Identify discharge learning needs (meds, wound care, etc.)  - Arrange for interpretive services to assist at discharge as needed  - Refer to Case Management Department for coordinating discharge planning if the patient needs post-hospital services based on physician/advanced practitioner order or complex needs related to functional status, cognitive ability, or social support system  Outcome: Progressing     Problem: Knowledge Deficit  Goal: Patient/family/caregiver demonstrates understanding of disease process, treatment plan, medications, and discharge instructions  Description: Complete learning assessment and assess knowledge base.  Interventions:  - Provide teaching at level of understanding  - Provide teaching via preferred learning methods  Outcome: Progressing     Problem: RESPIRATORY - ADULT  Goal: Achieves optimal ventilation and oxygenation  Description: INTERVENTIONS:  - Assess for changes in respiratory status  - Assess for changes in mentation and behavior  - Position to facilitate oxygenation and minimize respiratory effort  - Oxygen administered by appropriate delivery if ordered  - Initiate smoking cessation education as indicated  - Encourage broncho-pulmonary hygiene including cough, deep breathe, Incentive Spirometry  - Assess the need for suctioning and aspirate as needed  - Assess and instruct to report SOB or any respiratory difficulty  - Respiratory Therapy support as indicated  Outcome: Progressing

## 2025-02-24 NOTE — PROGRESS NOTES
Progress Note - Hospitalist   Name: Lisa Davidson Jr. 54 y.o. male I MRN: [id number]  Unit/Bed#: 409-01 I Date of Admission: 2/23/2025   Date of Service: 2/24/2025 I Hospital Day: 1    Assessment & Plan  Suspected venous thromboembolism (VTE)  Presented with SOB, chest pain and Rt leg swelling.   At baseline he gets SOB while walking on level ground.   Doesn't use home O2.   Chest pain: started >1 week ago, intermittent, worsening with deep inspiration and sitting up.   Rt Leg swelling: started >1 week ago. No redness, swelling noted.     Lab Results   Component Value Date    DDIMER 1.19 (H) 02/23/2025    DDIMER 1.17 (H) 12/07/2020       Risk factors:  with sedentary Lifestyle, Chest pain, POA HR >100.   No H/O cancer or hemoptysis.     Well score for PE: 9 points for Clinical S/S DVT, likley PE, HR>100, Immobilization.   He his high risk for PE.     Plan;  VAS duplex scan B/L legs: No acute DVT  Pt has contrast allergy- angioedema. Hence started on IV Heparin PE protocol. V/Q scan resulted on 2/24. Negative for PE. Will d/c IV heparin protocol.   V/Q scan: Perfusion imaging demonstrates slightly heterogeneous distribution of the radiopharmaceutical throughout both lungs. There is no significant segmental or subsegmental defect. The probability for pulmonary embolus is low.   Cardiac ECHO: final read pending  Chest pain on exertion  Presented with SOB, chest pain and Rt leg swelling.   At baseline he gets SOB while walking on level ground.   Doesn't use home O2.   Chest pain: started >1 week ago, intermittent, worsening with deep inspiration and sitting up.     EKG: sinus tachycardia.   Lab Results   Component Value Date    HSTNI0 4 02/23/2025    HSTNI2 4 02/23/2025    HSTNID2 0 02/23/2025     Plan:  Exclude PE.   CT CHEST: Heavy atherosclerotic coronary artery calcification. Minimal pericardial effusion. New compared to prior.   NO New EKG ischemic changes.   Previous ECHO 60 percent EF. Will  order repeat ECHO.       Emphysematous COPD (HCC)  Smokes 2-3 packs of cigarettes a day.   At baseline: SOB while walking on level ground.   NO PFTS on file.     CT Chest: Biapical paraseptal emphysema. Mild bilateral linear plate atelectasis. Small round 6 mm nodule persisting in the medial posterior left lower lobe measuring 6 mm image 4/93. This correlates well with the area of prior cavitary lesion in 2020 and 2021. No further work-up recommended at this time. Likely representing residual scarring.    Plan:  Pulmicort and Atorvent Nebs. Will hold of on Xopenex for now due to Tachy.   Nicotine patch  Pulm rehab Upon D/c.     Acute gastroenteritis  Pt has exposure to sick contacts.   Wife also has similar diarrhea and vomiting since a week.   Pt reports >13 watery diarrhea a day.   No problems with urination.   CT abdomen:   Colonic diverticulosis without findings of acute diverticulitis. There is fluid distention of the stomach and proximal half of the small bowel. Although the distal ileum is completely collapsed, there does not appear to be a discrete obstructing point, rather there is a gradual transition zone seen in the left mid abdomen without associated inflammation or obstructing abnormality.    Plan:  Possible tachy in the setting of dehydration. S/P 1 L Nacl Bolus in the ED.   S/p 500 ml IV Bolus.   C diff testing, stool enteric panel, Giardia antigen ordered. F/u with results. - pending   Encephalomalacia on imaging study  CT head 2017: chronic appearing encephalomalacia in rt frontal lobe and rt cerebellum.   Pt has H/O TBI 34 years ago, per family spend days in the ICU. No reports available on [employer] everywhere.   Repeat CT head:     Plan:  Suspicious for PE. Started on IV Heparin.   baseline CT Head without contrast: NO acute bleed.   Pt has allergy to iodinated contrast.   Obesity (BMI 30-39.9)  Weight 300 lb, works as .  H/o SKYLER and sleep study years ago. No reports available.      Plan:  Lifestyle modifications.   F/u with PCP / weight loss management upon d/c  SKYLER (obstructive sleep apnea)  Lab Results   Component Value Date    HCT 44.4 02/24/2025    HCT 48.1 02/23/2025    HCT 50.9 (H) 02/23/2025     Elevated HCT in the setting of Obesity, SKYLER and smoking.     Plan:  Bipap PRN for resp distress.     VTE Pharmacologic Prophylaxis:   Moderate Risk (Score 3-4) - Pharmacological DVT Prophylaxis Ordered: heparin drip.    Mobility:   Basic Mobility Inpatient Raw Score: 24  JH-HLM Goal: 8: Walk 250 feet or more  JH-HLM Achieved: 6: Walk 10 steps or more  JH-HLM Goal NOT achieved. Continue with multidisciplinary rounding and encourage appropriate mobility to improve upon JH-HLM goals.    Patient Centered Rounds: I performed bedside rounds with nursing staff today.   Discussions with Specialists or Other Care Team Provider: none         Current Length of Stay: 1 day(s)  Current Patient Status: Inpatient   Certification Statement: The patient will continue to require additional inpatient hospital stay due to Acute Gastro enteritis  Discharge Plan: Anticipate discharge in 24-48 hrs to home.    Code Status: Level 1 - Full Code    Subjective   Patient is doing well.  No overnight shortness of breath.  No decrease in saturations noted.  Still complains of watery diarrhea.  Denies abdominal pain or blood in the stools    Objective :  Temp:  [97.6 °F (36.4 °C)-98.2 °F (36.8 °C)] 98.2 °F (36.8 °C)  HR:  [76-92] 76  BP: (120-134)/(75-85) 126/75  Resp:  [14-20] 14  SpO2:  [92 %-94 %] 94 %  O2 Device: None (Room air)    Body mass index is 42.11 kg/m².     Input and Output Summary (last 24 hours):     Intake/Output Summary (Last 24 hours) at 2/24/2025 1228  Last data filed at 2/24/2025 0824  Gross per 24 hour   Intake 1880 ml   Output --   Net 1880 ml       Physical Exam  Vitals reviewed.   Constitutional:       Appearance: He is obese.   Eyes:      Conjunctiva/sclera: Conjunctivae normal.   Cardiovascular:       Rate and Rhythm: Normal rate.      Pulses: Normal pulses.   Pulmonary:      Effort: Pulmonary effort is normal.   Abdominal:      General: Bowel sounds are normal. There is no distension.      Palpations: There is no mass.      Tenderness: There is abdominal tenderness. There is rebound. There is no right CVA tenderness, left CVA tenderness or guarding.      Hernia: No hernia is present.   Musculoskeletal:         General: Normal range of motion.      Cervical back: Normal range of motion.   Skin:     General: Skin is warm.      Capillary Refill: Capillary refill takes less than 2 seconds.   Neurological:      Mental Status: Mental status is at baseline.   Psychiatric:         Mood and Affect: Mood normal.           Lines/Drains:              Lab Results: I have reviewed the following results:   Results from last 7 days   Lab Units 02/24/25  0624 02/23/25  1502 02/23/25  1025   WBC Thousand/uL 6.89   < > 8.76   HEMOGLOBIN g/dL 14.8   < > 16.8   HEMATOCRIT % 44.4   < > 50.9*   PLATELETS Thousands/uL 191   < > 230   SEGS PCT %  --   --  74   LYMPHO PCT %  --   --  15   MONO PCT %  --   --  8   EOS PCT %  --   --  2    < > = values in this interval not displayed.     Results from last 7 days   Lab Units 02/24/25  0624   SODIUM mmol/L 136   POTASSIUM mmol/L 3.3*   CHLORIDE mmol/L 108   CO2 mmol/L 19*   BUN mg/dL 19   CREATININE mg/dL 0.96   ANION GAP mmol/L 9   CALCIUM mg/dL 8.3*   ALBUMIN g/dL 3.7   TOTAL BILIRUBIN mg/dL 0.29   ALK PHOS U/L 63   ALT U/L 54*   AST U/L 32   GLUCOSE RANDOM mg/dL 99     Results from last 7 days   Lab Units 02/23/25  1502   INR  0.98             Results from last 7 days   Lab Units 02/23/25  1025   LACTIC ACID mmol/L 0.8   PROCALCITONIN ng/ml 0.17       Recent Cultures (last 7 days):         Imaging Results Review: I reviewed radiology reports from this admission including: V/Q scan .  Other Study Results Review: EKG was reviewed.     Last 24 Hours Medication List:     Current  Facility-Administered Medications:     albuterol inhalation solution 2.5 mg, Q4H PRN    heparin (porcine) 25,000 units in 0.45% NaCl 250 mL infusion (premix), Titrated, Last Rate: 17 Units/kg/hr (02/24/25 0730)    nicotine (NICODERM CQ) 21 mg/24 hr TD 24 hr patch 21 mg, Once    nicotine polacrilex (NICORETTE) gum 4 mg, Q2H PRN    Administrative Statements   Today, Patient Was Seen By: Cecilia Leavitt MD      **Please Note: This note may have been constructed using a voice recognition system.**

## 2025-02-24 NOTE — NURSING NOTE
Pt discharged to home.  D/c instructions given and reviewed with pt.  Pt verbalized understanding.  Pt left floor ambulatory.

## 2025-02-24 NOTE — ASSESSMENT & PLAN NOTE
CT head 2017: chronic appearing encephalomalacia in rt frontal lobe and rt cerebellum.   Pt has H/O TBI 34 years ago, per family spend days in the ICU. No reports available on [facility] everywhere.   Repeat CT head:     Plan:  Suspicious for PE. Started on IV Heparin.   baseline CT Head without contrast: NO acute bleed.   Pt has allergy to iodinated contrast.

## 2025-02-24 NOTE — ASSESSMENT & PLAN NOTE
Pt has exposure to sick contacts.   Wife also has similar diarrhea and vomiting since a week.   Pt reports >13 watery diarrhea a day.   No problems with urination.   CT abdomen:   Colonic diverticulosis without findings of acute diverticulitis. There is fluid distention of the stomach and proximal half of the small bowel. Although the distal ileum is completely collapsed, there does not appear to be a discrete obstructing point, rather there is a gradual transition zone seen in the left mid abdomen without associated inflammation or obstructing abnormality.    Plan:  Possible tachy in the setting of dehydration. S/P 1 L Nacl Bolus in the ED.   S/p 500 ml IV Bolus.   C diff testing: pending   stool enteric panel: Normal  Giardia antigen ordered: Pending.

## 2025-02-24 NOTE — ASSESSMENT & PLAN NOTE
Presented with SOB, chest pain and Rt leg swelling.   At baseline he gets SOB while walking on level ground.   Doesn't use home O2.   Chest pain: started >1 week ago, intermittent, worsening with deep inspiration and sitting up.   Rt Leg swelling: started >1 week ago. No redness, swelling noted.     Lab Results   Component Value Date    DDIMER 1.19 (H) 02/23/2025    DDIMER 1.17 (H) 12/07/2020       Risk factors:  with sedentary Lifestyle, Chest pain, POA HR >100.   No H/O cancer or hemoptysis.     Well score for PE: 9 points for Clinical S/S DVT, likley PE, HR>100, Immobilization.   He his high risk for PE.     Plan;  VAS duplex scan B/L legs: No acute DVT  Pt has contrast allergy- angioedema. Hence started on IV Heparin PE protocol. V/Q scan resulted on 2/24. Negative for PE. Will d/c IV heparin protocol.   V/Q scan: Perfusion imaging demonstrates slightly heterogeneous distribution of the radiopharmaceutical throughout both lungs. There is no significant segmental or subsegmental defect. The probability for pulmonary embolus is low.   Cardiac ECHO: final read pending

## 2025-02-24 NOTE — DISCHARGE SUMMARY
Discharge Summary - Hospitalist   Name: Lisa Davidson Jr. 54 y.o. male I MRN: [id number]  Unit/Bed#: 409-01 I Date of Admission: 2/23/2025   Date of Service: 2/24/2025 I Hospital Day: 1     Assessment & Plan  Suspected venous thromboembolism (VTE)  Presented with SOB, chest pain and Rt leg swelling.   At baseline he gets SOB while walking on level ground.   Doesn't use home O2.   Chest pain: started >1 week ago, intermittent, worsening with deep inspiration and sitting up.   Rt Leg swelling: started >1 week ago. No redness, swelling noted.     Lab Results   Component Value Date    DDIMER 1.19 (H) 02/23/2025    DDIMER 1.17 (H) 12/07/2020       Risk factors:  with sedentary Lifestyle, Chest pain, POA HR >100.   No H/O cancer or hemoptysis.     Well score for PE: 9 points for Clinical S/S DVT, likley PE, HR>100, Immobilization.      Plan;  VAS duplex scan B/L legs: No acute DVT  Pt has contrast allergy- angioedema. Hence started on IV Heparin PE protocol. V/Q scan resulted on 2/24. Negative for PE. Will d/c IV heparin protocol.   V/Q scan: Perfusion imaging demonstrates slightly heterogeneous distribution of the radiopharmaceutical throughout both lungs. There is no significant segmental or subsegmental defect. The probability for pulmonary embolus is low.   Cardiac ECHO: EF 55 percent.   Chest pain on exertion  Presented with SOB, chest pain and Rt leg swelling.   At baseline he gets SOB while walking on level ground.   Doesn't use home O2.   Chest pain: started >1 week ago, intermittent, worsening with deep inspiration and sitting up.     EKG: sinus tachycardia.   Lab Results   Component Value Date    HSTNI0 4 02/23/2025    HSTNI2 4 02/23/2025    HSTNID2 0 02/23/2025     Plan:  Exclude PE.   CT CHEST: Heavy atherosclerotic coronary artery calcification. Minimal pericardial effusion. New compared to prior.   NO New EKG ischemic changes.   Previous ECHO 60 percent EF.   No structural lesions on ECHO.        Emphysematous COPD (HCC)  Smokes 2-3 packs of cigarettes a day.   At baseline: SOB while walking on level ground.   NO PFTS on file.     CT Chest: Biapical paraseptal emphysema. Mild bilateral linear plate atelectasis. Small round 6 mm nodule persisting in the medial posterior left lower lobe measuring 6 mm image 4/93. This correlates well with the area of prior cavitary lesion in 2020 and 2021. No further work-up recommended at this time. Likely representing residual scarring.    Plan:  Pulmicort and Atorvent Nebs. Will hold of on Xopenex for now due to Tachy.   Nicotine patch  Pulm rehab Upon D/c.     Acute gastroenteritis  Pt has exposure to sick contacts.   Wife also has similar diarrhea and vomiting since a week.   Pt reports >13 watery diarrhea a day.   No problems with urination.   CT abdomen:   Colonic diverticulosis without findings of acute diverticulitis. There is fluid distention of the stomach and proximal half of the small bowel. Although the distal ileum is completely collapsed, there does not appear to be a discrete obstructing point, rather there is a gradual transition zone seen in the left mid abdomen without associated inflammation or obstructing abnormality.    Plan:  Possible tachy in the setting of dehydration. S/P 1 L [place] Bolus in the ED.   S/p 500 ml IV Bolus.   C diff testing: pending   stool enteric panel: Normal  Giardia antigen ordered: Pending.     Encephalomalacia on imaging study  CT head 2017: chronic appearing encephalomalacia in rt frontal lobe and rt cerebellum.   Pt has H/O TBI 34 years ago, per family spend days in the ICU. No reports available on [facility] everywhere.   Repeat CT head:     Plan:  Suspicious for PE. Started on IV Heparin.   baseline CT Head without contrast: NO acute bleed.   Pt has allergy to iodinated contrast.   Obesity (BMI 30-39.9)  Weight 300 lb, works as .  H/o SKYLER and sleep study years ago. No reports available.     Plan:  Lifestyle  modifications.   F/u with PCP / weight loss management upon d/c  SKYLER (obstructive sleep apnea)  Lab Results   Component Value Date    HCT 44.4 02/24/2025    HCT 48.1 02/23/2025    HCT 50.9 (H) 02/23/2025     Elevated HCT in the setting of Obesity, SKYLER and smoking.     Plan:  Bipap PRN for resp distress.      Medical Problems       Resolved Problems  Date Reviewed: 12/6/2021   None       Discharging Physician / Practitioner: Cecilia Leavitt MD  PCP: Cecilia Leavitt MD  Admission Date:   Admission Orders (From admission, onward)       Ordered        02/23/25 1439  Inpatient Admission  Once                          Discharge Date: 02/24/25    Consultations During Hospital Stay:  None     Procedures Performed:   V/Q scan and Cardiac ECHO and CT chest abdomen and pelvis without contrast    Significant Findings / Test Results:   Cardiac echo 55% ejection fraction.  NM lung perfusion imaging: The probability for pulmonary embolus is low  VAS venous duplex lower limb bilateral: No evidence of DVT.  CT chest abdomen pelvis without contrast:   Fluid distention of the stomach and proximal half of the small bowel. Gradual transition to collapsed distal small bowel in the left mid abdomen. No discrete obstructing point identified.  Minimal pericardial effusion, new compared to prior.  Small residual nodule in the left lower lobe in the area of prior cavitary lesion. No further work-up indicated.  Round well-defined hypodensities in both kidneys, likely cysts.    Incidental Findings:   Small residual nodule in the left lower lobe in the area of prior cavitary lesion. No further work-up indicated.   Round well-defined hypodensities in both kidneys, likely cysts.    Test Results Pending at Discharge (will require follow up):   C. difficile testing and Giardia stool antigen     Outpatient Tests Requested:  PFT and sleep study    Complications: None    Reason for Admission:  SOB, chest pain and diarrhea.      Hospital Course:   Lisa Davidson Jr. is a 54 y.o. male patient who originally presented to the hospital on 2/23/2025 due to  SOB, chest pain and diarrhea. PMH of TBI,obesity, SKYLER, pansinusitis,Post cholecystectomy.  At baseline he gets SOB while walking on level ground. Doesn't use home O2.   Chest pain: started >1 week ago, intermittent, worsening with deep inspiration and sitting up.   Rt Leg swelling: started >1 week ago. No redness, swelling noted.   In the ED, Workup is noteworthy for mild pericardial effusion there is an elevated dimer unfortunately he cannot have either night contrast agent secondary to angioedema true anaphylaxis.   EKG: Sinus tachycardia. CT Chest and abdomen: Colonic diverticulosis, emphysema, Heavy atherosclerotic coronary artery calcification..  Risk factors:  with sedentary Lifestyle, Chest pain, POA HR >100.   Well score for PE: 9 points for Clinical S/S DVT, likley PE, HR>100, Immobilization   started him on IVF Bolus, IV Heparin PE protocol with bolus and V/Q scan on 2/24/25.   NM lung perfusion imaging: The probability for pulmonary embolus is low  Cardiac echo 55% ejection fraction.    For diarrhea he received total 1500 mL of IV fluids, tachycardia resolved.  He still has ongoing diarrhea his stool and red panel is negative, C. difficile and Giardia testing are still pending.  At the time of discharge he denies abdominal pain, blood in the stool, abdominal tenderness or distention. patient expresses his wishes to go home today.  But he is willing to try low-fat low residue diet, lactulose free diet this afternoon and received IV fluids for hydration.   Patient wife is at bedside worried about the stool culture results, either me or one of our staff will give the patient call to update the stool C. difficile and Giardia results.     For his smoking, albuterol nebs were given which resolved his shortness of breath and wheezing.  Patient was maintained on nicotine  "gum 4 mg and nicotine patch 21 mg.  Will order [facility] and smoking cessation program.      Patient to follow-up with PCP within 1 week after discharge.   He will need outpatient sleep study and PFTs.    Please see above list of diagnoses and related plan for additional information.     Condition at Discharge: stable    Discharge Day Visit / Exam:   Subjective: Patient is doing well no overnight events.  Vitals: Blood Pressure: 126/75 (02/24/25 1120)  Pulse: 76 (02/24/25 1120)  Temperature: 98.2 °F (36.8 °C) (02/24/25 0712)  Temp Source: Oral (02/24/25 0712)  Respirations: 14 (02/24/25 0712)  Height: 6' 2\" (188 cm) (02/24/25 1120)  Weight - Scale: (!) 149 kg (328 lb) (02/24/25 1120)  SpO2: 94 % (02/23/25 1724)  Physical Exam  Vitals reviewed.   Constitutional:       Appearance: He is obese.   Eyes:      Conjunctiva/sclera: Conjunctivae normal.   Cardiovascular:      Rate and Rhythm: Normal rate.      Pulses: Normal pulses.   Pulmonary:      Effort: Pulmonary effort is normal.      Breath sounds: Normal breath sounds.   Abdominal:      General: Bowel sounds are normal.      Palpations: There is no mass.      Tenderness: There is no abdominal tenderness. There is no guarding or rebound.      Hernia: No hernia is present.   Musculoskeletal:         General: Normal range of motion.      Cervical back: Normal range of motion.   Skin:     General: Skin is warm.      Capillary Refill: Capillary refill takes less than 2 seconds.   Neurological:      Mental Status: He is alert. Mental status is at baseline.   Psychiatric:         Mood and Affect: Mood normal.          Discussion with Family: Updated  (wife) at bedside.    Discharge instructions/Information to patient and family:   See after visit summary for information provided to patient and family.      Provisions for Follow-Up Care:  See after visit summary for information related to follow-up care and any pertinent home health orders.      Mobility at time of " Discharge:   Basic Mobility Inpatient Raw Score: 24  JH-HLM Goal: 8: Walk 250 feet or more  JH-HLM Achieved: 6: Walk 10 steps or more  HLM Goal achieved. Continue to encourage appropriate mobility.     Disposition:   Home    Planned Readmission: none     Discharge Medications:  See after visit summary for reconciled discharge medications provided to patient and/or family.      Administrative Statements   Discharge Statement:  I have spent a total time of 45 minutes in caring for this patient on the day of the visit/encounter. >30 minutes of time was spent on: Diagnostic results, Prognosis, Risks and benefits of tx options, Instructions for management, Patient and family education, Importance of tx compliance, Risk factor reductions, Impressions, Counseling / Coordination of care, Documenting in the medical record, Reviewing / ordering tests, medicine, procedures  , and Communicating with other healthcare professionals .    **Please Note: This note may have been constructed using a voice recognition system**

## 2025-02-24 NOTE — PLAN OF CARE
Problem: PAIN - ADULT  Goal: Verbalizes/displays adequate comfort level or baseline comfort level  Description: Interventions:  - Encourage patient to monitor pain and request assistance  - Assess pain using appropriate pain scale  - Administer analgesics based on type and severity of pain and evaluate response  - Implement non-pharmacological measures as appropriate and evaluate response  - Consider cultural and social influences on pain and pain management  - Notify physician/advanced practitioner if interventions unsuccessful or patient reports new pain  2/23/2025 2036 by Esau Aiken RN  Outcome: Progressing  2/23/2025 2035 by Esau Aiken RN  Outcome: Progressing     Problem: INFECTION - ADULT  Goal: Absence or prevention of progression during hospitalization  Description: INTERVENTIONS:  - Assess and monitor for signs and symptoms of infection  - Monitor lab/diagnostic results  - Monitor all insertion sites, i.e. indwelling lines, tubes, and drains  - Monitor endotracheal if appropriate and nasal secretions for changes in amount and color  - [place] appropriate cooling/warming therapies per order  - Administer medications as ordered  - Instruct and encourage patient and family to use good hand hygiene technique  - Identify and instruct in appropriate isolation precautions for identified infection/condition  2/23/2025 2036 by Esau Aiken RN  Outcome: Progressing  2/23/2025 2035 by Esau Aiken RN  Outcome: Progressing     Problem: SAFETY ADULT  Goal: Patient will remain free of falls  Description: INTERVENTIONS:  - Educate patient/family on patient safety including physical limitations  - Instruct patient to call for assistance with activity   - Consult OT/PT to assist with strengthening/mobility   - Keep Call bell within reach  - Keep bed low and locked with side rails adjusted as appropriate  - Keep care items and personal belongings within reach  - Initiate and maintain comfort rounds  -  Make Fall Risk Sign visible to staff  - Offer Toileting every 2 Hours, in advance of need  - Initiate/Maintain bed/chair alarm  - Obtain necessary fall risk management equipment: non skid socks  - Apply yellow socks and bracelet for high fall risk patients  - Consider moving patient to room near nurses station  2/23/2025 2036 by Esau Aiken RN  Outcome: Progressing  2/23/2025 2035 by Esau Aiken RN  Outcome: Progressing     Problem: DISCHARGE PLANNING  Goal: Discharge to home or other facility with appropriate resources  Description: INTERVENTIONS:  - Identify barriers to discharge w/patient and caregiver  - Arrange for needed discharge resources and transportation as appropriate  - Identify discharge learning needs (meds, wound care, etc.)  - Arrange for interpretive services to assist at discharge as needed  - Refer to Case Management Department for coordinating discharge planning if the patient needs post-hospital services based on physician/advanced practitioner order or complex needs related to functional status, cognitive ability, or social support system  2/23/2025 2036 by Esau Aiken RN  Outcome: Progressing  2/23/2025 2035 by Esau Aiken RN  Outcome: Progressing     Problem: Knowledge Deficit  Goal: Patient/family/caregiver demonstrates understanding of disease process, treatment plan, medications, and discharge instructions  Description: Complete learning assessment and assess knowledge base.  Interventions:  - Provide teaching at level of understanding  - Provide teaching via preferred learning methods  2/23/2025 2036 by Esau Aiken RN  Outcome: Progressing  2/23/2025 2035 by Esau Aiken RN  Outcome: Progressing

## 2025-02-24 NOTE — ASSESSMENT & PLAN NOTE
Presented with SOB, chest pain and Rt leg swelling.   At baseline he gets SOB while walking on level ground.   Doesn't use home O2.   Chest pain: started >1 week ago, intermittent, worsening with deep inspiration and sitting up.     EKG: sinus tachycardia.   Lab Results   Component Value Date    HSTNI0 4 02/23/2025    HSTNI2 4 02/23/2025    HSTNID2 0 02/23/2025     Plan:  Exclude PE.   CT CHEST: Heavy atherosclerotic coronary artery calcification. Minimal pericardial effusion. New compared to prior.   NO New EKG ischemic changes.   Previous ECHO 60 percent EF.   No structural lesions on ECHO.

## 2025-02-24 NOTE — ASSESSMENT & PLAN NOTE
CT head 2017: chronic appearing encephalomalacia in rt frontal lobe and rt cerebellum.   Pt has H/O TBI 34 years ago, per family spend days in the ICU. No reports available on [facility] everywhere.   Repeat CT head:     Plan:  Suspicious for PE. Started on IV Heparin.   baseline CT Head without contrast: NO acute bleed.   Pt has allergy to iodinated contrast.

## 2025-02-24 NOTE — ASSESSMENT & PLAN NOTE
Presented with SOB, chest pain and Rt leg swelling.   At baseline he gets SOB while walking on level ground.   Doesn't use home O2.   Chest pain: started >1 week ago, intermittent, worsening with deep inspiration and sitting up.     EKG: sinus tachycardia.   Lab Results   Component Value Date    HSTNI0 4 02/23/2025    HSTNI2 4 02/23/2025    HSTNID2 0 02/23/2025     Plan:  Exclude PE.   CT CHEST: Heavy atherosclerotic coronary artery calcification. Minimal pericardial effusion. New compared to prior.   NO New EKG ischemic changes.   Previous ECHO 60 percent EF. Will order repeat ECHO.

## 2025-02-24 NOTE — CASE MANAGEMENT
Case Management Discharge Planning Note    Patient name Lisa Davidson Jr.  Location /409-01 MRN [id number]  : 1970 Date 2025       Current Admission Date: 2025  Current Admission Diagnosis:Suspected venous thromboembolism (VTE)   Patient Active Problem List    Diagnosis Date Noted Date Diagnosed    Encephalomalacia on imaging study 2025     Suspected venous thromboembolism (VTE) 2025     Acute gastroenteritis 2025     Emphysematous COPD (HCC) 2025     Chest pain on exertion 2025     SKYLER (obstructive sleep apnea)      Cigarette nicotine dependence without complication 2021     Obesity (BMI 30-39.9) 2021     Tremor 2020     Cavitary lesion of lung 2020     Microscopic hematuria 2020     Pleural effusion on left 2020     Tobacco use 2020       LOS (days): 1  Geometric Mean LOS (GMLOS) (days):   Days to GMLOS:     OBJECTIVE:  Risk of Unplanned Readmission Score: 10.29         Current admission status: Inpatient   Preferred Pharmacy:   [facility] [address] [place] [address], [address]  [address] [place] [address], [address] [facility] [address]  Phone: [phone] Fax: [phone]    Primary Care Provider: Patrick Perry MD    Primary Insurance: COMMERCIAL MISCELLANEOUS  Secondary Insurance:     DISCHARGE DETAILS:  Chart review done. Pt with suspected venous thromboembolism. Currently on Heparin drip. If home on Eliquis or Xarelto  will provide 30 day free card as well as $10 co pay card. Will continue to follow and assist in dc planning.

## 2025-02-24 NOTE — ASSESSMENT & PLAN NOTE
Lab Results   Component Value Date    HCT 44.4 02/24/2025    HCT 48.1 02/23/2025    HCT 50.9 (H) 02/23/2025     Elevated HCT in the setting of Obesity, SKYLER and smoking.     Plan:  Bipap PRN for resp distress.

## 2025-02-24 NOTE — ASSESSMENT & PLAN NOTE
Presented with SOB, chest pain and Rt leg swelling.   At baseline he gets SOB while walking on level ground.   Doesn't use home O2.   Chest pain: started >1 week ago, intermittent, worsening with deep inspiration and sitting up.   Rt Leg swelling: started >1 week ago. No redness, swelling noted.     Lab Results   Component Value Date    DDIMER 1.19 (H) 02/23/2025    DDIMER 1.17 (H) 12/07/2020       Risk factors:  with sedentary Lifestyle, Chest pain, POA HR >100.   No H/O cancer or hemoptysis.     Well score for PE: 9 points for Clinical S/S DVT, likley PE, HR>100, Immobilization.      Plan;  VAS duplex scan B/L legs: No acute DVT  Pt has contrast allergy- angioedema. Hence started on IV Heparin PE protocol. V/Q scan resulted on 2/24. Negative for PE. Will d/c IV heparin protocol.   V/Q scan: Perfusion imaging demonstrates slightly heterogeneous distribution of the radiopharmaceutical throughout both lungs. There is no significant segmental or subsegmental defect. The probability for pulmonary embolus is low.   Cardiac ECHO: EF 55 percent.

## 2025-02-24 NOTE — UTILIZATION REVIEW
Initial Clinical Review    Admission: Date/Time/Statement:   Admission Orders (From admission, onward)       Ordered        02/23/25 1439  Inpatient Admission  Once                          Orders Placed This Encounter   Procedures    Inpatient Admission     Standing Status:   Standing     Number of Occurrences:   1     Level of Care:   Med Surg [16]     Estimated length of stay:   More than 2 Midnights     Certification:   I certify that inpatient services are medically necessary for this patient for a duration of greater than two midnights. See H&P and MD Progress Notes for additional information about the patient's course of treatment.     ED Arrival Information       Expected   -    Arrival   2/23/2025 09:28    Acuity   Emergent              Means of arrival   Walk-In    Escorted by   Self    Service   Hospitalist    Admission type   Emergency              Arrival complaint   cough, sob, diarrhea             Chief Complaint   Patient presents with    Shortness of Breath     Patient reports increase in worsening SOB.        Initial Presentation: 54 y.o. male presents to ed from home on 2-23-25 for evaluation and treatment of chest pain,  shortness of breath, cough, diarrhea.  Chest pain is not positional, worsened with deep inspiration and associated with shortness of breath. He does complain of right lower extremity swelling for the past 2 weeks. 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  PNHX: COPD no on home O2, BMI 41.46, TBI.  Clinical assessment significant for RLE edema, wheezing and rales.    D dimer 1.19, K 3.0.  Imaging shows fluid distension of stomach and proximal small bowel, new pericardial effusion. Initially treated with iv .9% ns bolus 1L, iv zofran x1, bentyl x1, iv Kcl 20 meq x 2.  Admit to inpatient med surg suspected PE, DVT and pericardial effusion for heparin gtt, venous duplex, 2D Echo, VQ scan, CT head to re assess TBI,      Anticipated Length of Stay/Certification Statement: Patient will be  admitted on an inpatient basis with an anticipated length of stay of greater than 2 midnights secondary to Suspected VTE due to SOB .     Date: 2-24-25    Day 2: inpatient med surg   CPAP overnight declines. Plan venous duplex and VQ scan.  Remains on heparin gtt.     Addendum : Echo EF 55%.Venous duplex negative for DVT.  VQ scan negative for PE. Iv heparin discontinued. Tachycardia resolved with iv fluids.  Stool panel is negative.  Since he continues to have diarrhea, advise patient begin a low fat low residue diet, lactulose free.  If tolerates diet will discharge to home.  With outpatient follow up for any pending  lab abnormalities      Discharged 2-24-25           ED Treatment-Medication Administration from 02/23/2025 0928 to 02/23/2025 1512         Date/Time Order Dose Route Action     02/23/2025 1026 sodium chloride 0.9 % bolus 1,000 mL 1,000 mL Intravenous New Bag     02/23/2025 1026 ondansetron (ZOFRAN) injection 4 mg 4 mg Intravenous Given     02/23/2025 1026 dicyclomine (BENTYL) tablet 20 mg 20 mg Oral Given     02/23/2025 1149 potassium chloride 20 mEq IVPB (premix) 20 mEq Intravenous New Bag     02/23/2025 1505 potassium chloride 20 mEq IVPB (premix) 20 mEq Intravenous New Bag     02/23/2025 1426 nicotine (NICODERM CQ) 21 mg/24 hr TD 24 hr patch 21 mg 21 mg Transdermal Medication Applied       Scheduled Medications:  nicotine, 21 mg, Transdermal, Once      Continuous IV Infusions:  heparin (porcine), 3-30 Units/kg/hr (Order-Specific), Intravenous, Titrated      PRN Meds:  albuterol, 2.5 mg, Nebulization, Q4H PRN  nicotine polacrilex, 4 mg, Oral, Q2H PRN      ED Triage Vitals [02/23/25 1000]   Temperature Pulse Respirations Blood Pressure SpO2 Pain Score   97.8 °F (36.6 °C) 105 18 (!) 179/114 94 % 7     Weight (last 2 days)       Date/Time Weight    02/23/25 1556 149 (328.71)            Vital Signs (last 3 days)       Date/Time Temp Pulse Resp BP MAP (mmHg) SpO2 O2 Device [place] Coma Scale Score Pain     02/24/25 0822 -- -- -- -- -- -- None (Room air) -- No Pain    02/24/25 07:12:53 98.2 °F (36.8 °C) -- 14 126/75 92 -- -- -- --    02/23/25 21:14:19 98 °F (36.7 °C) -- -- 125/79 94 -- -- -- --    02/23/25 1910 -- -- -- -- -- -- None (Room air) -- No Pain    02/23/25 1724 -- 76 18 -- -- 94 % None (Room air) -- --    02/23/25 16:02:43 97.7 °F (36.5 °C) 76 16 120/80 93 92 % None (Room air) -- --    02/23/25 1500 97.6 °F (36.4 °C) 82 20 120/76 89 93 % None (Room air) -- 5    02/23/25 1330 97.8 °F (36.6 °C) 92 20 134/85 105 93 % None (Room air) -- 5    02/23/25 1200 97.6 °F (36.4 °C) 93 22 126/79 96 94 % -- -- 5    02/23/25 1106 -- -- -- -- -- -- None (Room air) 15 --    02/23/25 1000 97.8 °F (36.6 °C) 105 18 179/114 143 94 % None (Room air) -- 7           Pertinent Labs/Diagnostic Test Results:   Radiology:  CT head wo contrast   Final (02/23 1808)      No acute process. Chronic changes as above.         CT chest abdomen pelvis wo contrast   Final (02/23 1304)      Fluid distention of the stomach and proximal half of the small bowel. Gradual transition to collapsed distal small bowel in the left mid abdomen. No discrete obstructing point identified.      Minimal pericardial effusion, new compared to prior.      Small residual nodule in the left lower lobe in the area of prior cavitary lesion. No further work-up indicated.      Round well-defined hypodensities in both kidneys, likely cysts.         XR chest 1 view portable   Final (02/23 1400)      No acute cardiopulmonary disease.       VAS VENOUS DUPLEX - LOWER LIMB BILATERAL    (Results Pending)   NM lung perfusion imaging (particulate)    (Results Pending)     Cardiology:  ECG 12 lead    by Interface, Ris Results In (02/23 0957)        GI:  No orders to display       Results from last 7 days   Lab Units 02/23/25  1025   SARS-COV-2  Negative     Results from last 7 days   Lab Units 02/24/25  0624 02/23/25  1502 02/23/25  1025   WBC Thousand/uL 6.89 8.04 8.76    HEMOGLOBIN g/dL 14.8 15.6 16.8   HEMATOCRIT % 44.4 48.1 50.9*   PLATELETS Thousands/uL 191 218 230   TOTAL NEUT ABS Thousands/µL  --   --  6.51         Results from last 7 days   Lab Units 02/24/25  0624 02/23/25  1025   SODIUM mmol/L 136 136   POTASSIUM mmol/L 3.3* 3.0*   CHLORIDE mmol/L 108 106   CO2 mmol/L 19* 18*   ANION GAP mmol/L 9 12   BUN mg/dL 19 22   CREATININE mg/dL 0.96 1.08   EGFR ml/min/1.73sq m 90 78   CALCIUM mg/dL 8.3* 8.7   MAGNESIUM mg/dL 2.0 1.9     Results from last 7 days   Lab Units 02/24/25  0624 02/23/25  1025   AST U/L 32 42*   ALT U/L 54* 49   ALK PHOS U/L 63 72   TOTAL PROTEIN g/dL 6.6 7.8   ALBUMIN g/dL 3.7 4.4   TOTAL BILIRUBIN mg/dL 0.29 0.38         Results from last 7 days   Lab Units 02/24/25  0624 02/23/25  1025   GLUCOSE RANDOM mg/dL 99 104       Results from last 7 days   Lab Units 02/23/25  1234 02/23/25  1025   HS TNI 0HR ng/L  --  4   HS TNI 2HR ng/L 4  --    HSTNI D2 ng/L 0  --      Results from last 7 days   Lab Units 02/23/25  1025   D-DIMER QUANTITATIVE ug/ml FEU 1.19*     Results from last 7 days   Lab Units 02/24/25  0624 02/23/25  2155 02/23/25  1502   PROTIME seconds  --   --  13.5   INR   --   --  0.98   PTT seconds 58* 112* 27         Results from last 7 days   Lab Units 02/23/25  1025   PROCALCITONIN ng/ml 0.17     Results from last 7 days   Lab Units 02/23/25  1025   LACTIC ACID mmol/L 0.8             Results from last 7 days   Lab Units 02/23/25  1025   BNP pg/mL 13       Results from last 7 days   Lab Units 02/23/25  1025   LIPASE u/L 13     Results from last 7 days   Lab Units 02/23/25  1025   INFLUENZA A PCR  Negative   INFLUENZA B PCR  Negative   RSV PCR  Negative       Past Medical History:   Diagnosis Date    Cavitary lesion of lung     Pneumonia      Present on Admission:   Obesity (BMI 30-39.9)   SKYLER (obstructive sleep apnea)      Admitting Diagnosis:     Shortness of breath [R06.02]  Diarrhea [R19.7]  Pericardial effusion [I31.39]  Chest pain  [R07.9]    Age/Sex: 54 y.o. male    Network Utilization Review Department  ATTENTION: Please call with any questions or concerns to [phone] and carefully listen to the prompts so that you are directed to the right person. All voicemails are confidential.   For Discharge needs, contact Care Management DC Support Team at [phone] opt. 2  Send all requests for admission clinical reviews, approved or denied determinations and any other requests to dedicated fax number below belonging to the campus where the patient is receiving treatment. List of dedicated fax numbers for the Facilities:  FACILITY NAME UR FAX NUMBER   ADMISSION DENIALS (Administrative/Medical Necessity) [phone]   DISCHARGE SUPPORT TEAM (NETWORK) [phone]   PARENT CHILD HEALTH (Maternity/NICU/Pediatrics) [phone]   [facility] [phone]   [facility] [phone]   [facility] [phone]   [facility] [phone]   [facility] [phone]   [facility] [phone]   [facility] [phone]   [facility] [phone]   [facility] [phone]   [facility] [phone]   [facility] [phone]   [facility] [phone]

## 2025-02-25 LAB
C DIFF TOX GENS STL QL NAA+PROBE: NEGATIVE
G LAMBLIA AG STL QL IA: NEGATIVE

## 2025-02-25 NOTE — UTILIZATION REVIEW
NOTIFICATION OF ADMISSION DISCHARGE   This is a Notification of Discharge from [facility]. Please be advised that this patient has been discharge from our facility. Below you will find the admission and discharge date and time including the patient’s disposition.   UTILIZATION REVIEW CONTACT:  Woodrow Marquis  Utilization   Network Utilization Review Department  Phone: [phone] x carefully listen to the prompts. All voicemails are confidential.  Email: [email or web address]     ADMISSION INFORMATION  PRESENTATION DATE: 2/23/2025  9:47 AM  OBERVATION ADMISSION DATE: N/A  INPATIENT ADMISSION DATE: 2/23/25  2:39 PM   DISCHARGE DATE: 2/24/2025  4:56 PM   DISPOSITION:Home/Self Care    Network Utilization Review Department  ATTENTION: Please call with any questions or concerns to [phone] and carefully listen to the prompts so that you are directed to the right person. All voicemails are confidential.   For Discharge needs, contact Care Management DC Support Team at [phone] opt. 2  Send all requests for admission clinical reviews, approved or denied determinations and any other requests to dedicated fax number below belonging to the campus where the patient is receiving treatment. List of dedicated fax numbers for the Facilities:  FACILITY NAME UR FAX NUMBER   ADMISSION DENIALS (Administrative/Medical Necessity) [phone]   DISCHARGE SUPPORT TEAM ([facility]) [phone]   PARENT CHILD HEALTH (Maternity/NICU/Pediatrics) [phone]   [facility] [phone]   [facility] [phone]   [facility] [phone]   [facility] [phone]   [facility] [phone]   [facility] [phone]   [facility] [phone]   [facility] [phone]   [facility]  [facility] [phone]   [facility] [phone]   [facility] [phone]   [facility] [phone]

## 2025-02-27 ENCOUNTER — TELEPHONE (OUTPATIENT)
Dept: SLEEP CENTER | Facility: CLINIC | Age: 55
End: 2025-02-27

## 2025-02-27 ENCOUNTER — TELEPHONE (OUTPATIENT)
Dept: FAMILY MEDICINE CLINIC | Facility: CLINIC | Age: 55
End: 2025-02-27

## 2025-02-27 NOTE — TELEPHONE ENCOUNTER
Referral placed for a home sleep study. Note does not reflect discussion of symptoms listed on order.    Please addend note with specific discussion of symptoms, not just SKYLER.     Following a review of the Sleep Study/Sleep Consult ordering and insurance approval process, going forward, the Sleep Medicine Department is asking that the specific symptoms selected within the order for a sleep study and/or Sleep Medicine Consult (i.e. witnessed apneas, snoring, daytime sleepiness, etc.) be directly discussed (if even briefly) within the note from the face-to-face encounter. This is a slight change from what was asked in the past, but will help with proper compliance from a coding and insurance approval standpoint, ensuring that sleep studies are able to obtain insurance approval and that patients are then able to obtain the studies without difficulty. Thank you!    Ordering and co-signing providers notified.

## 2025-02-27 NOTE — TELEPHONE ENCOUNTER
"I received a call from HR department at patient job. 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 He is set to return to work Sunday which is before his tcm appt with you.      Phone and fax are same number for HR department [phone]    Thanks!    Jay  "

## 2025-03-06 ENCOUNTER — TELEPHONE (OUTPATIENT)
Dept: FAMILY MEDICINE CLINIC | Facility: HOME HEALTHCARE | Age: 55
End: 2025-03-06

## 2025-03-06 NOTE — TELEPHONE ENCOUNTER
Provider requested me to call patient to verify if he was coming into the office for his apt today if he is unavailable she would still like to see patient virtual if possible. LVM to return my call.

## 2025-03-25 PROBLEM — K52.9 ACUTE GASTROENTERITIS: Status: RESOLVED | Noted: 2025-02-23 | Resolved: 2025-03-25

## 2025-06-06 ENCOUNTER — TELEPHONE (OUTPATIENT)
Dept: FAMILY MEDICINE CLINIC | Facility: CLINIC | Age: 55
End: 2025-06-06